# Patient Record
Sex: FEMALE | Race: WHITE | NOT HISPANIC OR LATINO | URBAN - METROPOLITAN AREA
[De-identification: names, ages, dates, MRNs, and addresses within clinical notes are randomized per-mention and may not be internally consistent; named-entity substitution may affect disease eponyms.]

---

## 2017-05-09 ENCOUNTER — OUTPATIENT (OUTPATIENT)
Dept: OUTPATIENT SERVICES | Facility: HOSPITAL | Age: 67
LOS: 1 days | Discharge: HOME | End: 2017-05-09

## 2017-06-28 DIAGNOSIS — Z12.31 ENCOUNTER FOR SCREENING MAMMOGRAM FOR MALIGNANT NEOPLASM OF BREAST: ICD-10-CM

## 2018-05-24 ENCOUNTER — OUTPATIENT (OUTPATIENT)
Dept: OUTPATIENT SERVICES | Facility: HOSPITAL | Age: 68
LOS: 1 days | Discharge: HOME | End: 2018-05-24

## 2018-05-24 DIAGNOSIS — Z12.31 ENCOUNTER FOR SCREENING MAMMOGRAM FOR MALIGNANT NEOPLASM OF BREAST: ICD-10-CM

## 2019-06-05 ENCOUNTER — OUTPATIENT (OUTPATIENT)
Dept: OUTPATIENT SERVICES | Facility: HOSPITAL | Age: 69
LOS: 1 days | Discharge: HOME | End: 2019-06-05
Payer: MEDICARE

## 2019-06-05 DIAGNOSIS — Z12.31 ENCOUNTER FOR SCREENING MAMMOGRAM FOR MALIGNANT NEOPLASM OF BREAST: ICD-10-CM

## 2019-06-05 PROCEDURE — 77063 BREAST TOMOSYNTHESIS BI: CPT | Mod: 26

## 2019-06-05 PROCEDURE — 77067 SCR MAMMO BI INCL CAD: CPT | Mod: 26

## 2019-06-06 ENCOUNTER — OUTPATIENT (OUTPATIENT)
Dept: OUTPATIENT SERVICES | Facility: HOSPITAL | Age: 69
LOS: 1 days | Discharge: HOME | End: 2019-06-06
Payer: MEDICARE

## 2019-06-06 DIAGNOSIS — R92.8 OTHER ABNORMAL AND INCONCLUSIVE FINDINGS ON DIAGNOSTIC IMAGING OF BREAST: ICD-10-CM

## 2019-06-06 PROCEDURE — G0279: CPT | Mod: 26,RT

## 2019-06-06 PROCEDURE — 77065 DX MAMMO INCL CAD UNI: CPT | Mod: 26,RT

## 2019-06-06 PROCEDURE — 76642 ULTRASOUND BREAST LIMITED: CPT | Mod: 26,RT

## 2020-11-11 ENCOUNTER — OUTPATIENT (OUTPATIENT)
Dept: OUTPATIENT SERVICES | Facility: HOSPITAL | Age: 70
LOS: 1 days | Discharge: HOME | End: 2020-11-11
Payer: MEDICARE

## 2020-11-11 DIAGNOSIS — Z12.31 ENCOUNTER FOR SCREENING MAMMOGRAM FOR MALIGNANT NEOPLASM OF BREAST: ICD-10-CM

## 2020-11-11 PROCEDURE — 77067 SCR MAMMO BI INCL CAD: CPT | Mod: 26

## 2020-11-11 PROCEDURE — 77063 BREAST TOMOSYNTHESIS BI: CPT | Mod: 26

## 2021-08-02 PROBLEM — Z00.00 ENCOUNTER FOR PREVENTIVE HEALTH EXAMINATION: Status: ACTIVE | Noted: 2021-08-02

## 2021-08-06 PROBLEM — Z63.4 WIDOW: Status: ACTIVE | Noted: 2021-08-06

## 2021-08-10 ENCOUNTER — APPOINTMENT (OUTPATIENT)
Dept: CARDIOLOGY | Facility: CLINIC | Age: 71
End: 2021-08-10
Payer: MEDICARE

## 2021-08-10 VITALS
HEIGHT: 65 IN | BODY MASS INDEX: 25.99 KG/M2 | WEIGHT: 156 LBS | DIASTOLIC BLOOD PRESSURE: 68 MMHG | SYSTOLIC BLOOD PRESSURE: 120 MMHG

## 2021-08-10 DIAGNOSIS — Z63.4 DISAPPEARANCE AND DEATH OF FAMILY MEMBER: ICD-10-CM

## 2021-08-10 PROCEDURE — 99214 OFFICE O/P EST MOD 30 MIN: CPT

## 2021-08-10 SDOH — SOCIAL STABILITY - SOCIAL INSECURITY: DISSAPEARANCE AND DEATH OF FAMILY MEMBER: Z63.4

## 2021-08-10 NOTE — PHYSICAL EXAM
[5th Left ICS - MCL] : palpated at the 5th LICS in the midclavicular line [No Precordial Heave] : no precordial heave was noted [Normal Rate] : normal [Rhythm Regular] : regular [Normal S1] : normal S1 [Normal S2] : normal S2 [I] : a grade 1 [No Pitting Edema] : no pitting edema present [2+] : left 2+ [No Abnormalities] : the abdominal aorta was not enlarged and no bruit was heard [Normal] : alert and oriented, normal memory [S3] : no S3 [S4] : no S4 [Right Carotid Bruit] : no bruit heard over the right carotid [Left Carotid Bruit] : no bruit heard over the left carotid [Right Femoral Bruit] : no bruit heard over the right femoral artery [Left Femoral Bruit] : no bruit heard over the left femoral artery

## 2021-08-10 NOTE — HISTORY OF PRESENT ILLNESS
[FreeTextEntry1] : Weight no change. Now back in gym. Doing 40 mins 3 days a week. On weight watchers. NNo chest pain. No sob. HERNANDEZ not change

## 2021-08-10 NOTE — DISCUSSION/SUMMARY
[FreeTextEntry1] :   [place] 3/21 alcohol withdrawal. Told continue  exercise. No change in weight. Now on weight watchers.  DSE on 3/19 EF 35-44%,mod MR. Echo possibly soon. Blood LDL 72 was 98.Persantine  fixed defect EF 47%. Now on simvastatin. Possible CAD. She retired 17. May sell house in 1 yr. She got covid vaccine. Switching [place].  Bloods reviewed.She needs foot surgery. Risk is low

## 2021-08-10 NOTE — REVIEW OF SYSTEMS
[Fever] : no fever [Chills] : no chills [Blurry Vision] : no blurred vision [Earache] : no earache [Sore Throat] : no sore throat [SOB] : no shortness of breath [Chest Discomfort] : no chest discomfort [Palpitations] : no palpitations [Cough] : no cough [Wheezing] : no wheezing [Abdominal Pain] : no abdominal pain [Diarrhea] : diarrhea [Constipation] : no constipation [Dysuria] : no dysuria [Joint Pain] : no joint pain [Myalgia] : no myalgia [Rash] : no rash [Skin Lesions] : no skin lesions [Dizziness] : no dizziness [Weakness] : no weakness [Confusion] : no confusion was observed [Swollen Glands] : no swollen glands

## 2021-09-20 ENCOUNTER — OUTPATIENT (OUTPATIENT)
Dept: OUTPATIENT SERVICES | Facility: HOSPITAL | Age: 71
LOS: 1 days | Discharge: HOME | End: 2021-09-20
Payer: MEDICARE

## 2021-09-20 VITALS
SYSTOLIC BLOOD PRESSURE: 152 MMHG | DIASTOLIC BLOOD PRESSURE: 67 MMHG | RESPIRATION RATE: 22 BRPM | TEMPERATURE: 97 F | WEIGHT: 160.72 LBS | HEIGHT: 65 IN | HEART RATE: 61 BPM | OXYGEN SATURATION: 100 %

## 2021-09-20 DIAGNOSIS — Z01.818 ENCOUNTER FOR OTHER PREPROCEDURAL EXAMINATION: ICD-10-CM

## 2021-09-20 DIAGNOSIS — Z98.890 OTHER SPECIFIED POSTPROCEDURAL STATES: Chronic | ICD-10-CM

## 2021-09-20 DIAGNOSIS — M20.22 HALLUX RIGIDUS, LEFT FOOT: ICD-10-CM

## 2021-09-20 LAB
ALBUMIN SERPL ELPH-MCNC: 4.6 G/DL — SIGNIFICANT CHANGE UP (ref 3.5–5.2)
ALP SERPL-CCNC: 90 U/L — SIGNIFICANT CHANGE UP (ref 30–115)
ALT FLD-CCNC: 27 U/L — SIGNIFICANT CHANGE UP (ref 0–41)
ANION GAP SERPL CALC-SCNC: 13 MMOL/L — SIGNIFICANT CHANGE UP (ref 7–14)
APTT BLD: 34.1 SEC — SIGNIFICANT CHANGE UP (ref 27–39.2)
AST SERPL-CCNC: 28 U/L — SIGNIFICANT CHANGE UP (ref 0–41)
BASOPHILS # BLD AUTO: 0.03 K/UL — SIGNIFICANT CHANGE UP (ref 0–0.2)
BASOPHILS NFR BLD AUTO: 0.4 % — SIGNIFICANT CHANGE UP (ref 0–1)
BILIRUB SERPL-MCNC: 0.7 MG/DL — SIGNIFICANT CHANGE UP (ref 0.2–1.2)
BUN SERPL-MCNC: 21 MG/DL — HIGH (ref 10–20)
CALCIUM SERPL-MCNC: 9.7 MG/DL — SIGNIFICANT CHANGE UP (ref 8.5–10.1)
CHLORIDE SERPL-SCNC: 103 MMOL/L — SIGNIFICANT CHANGE UP (ref 98–110)
CO2 SERPL-SCNC: 26 MMOL/L — SIGNIFICANT CHANGE UP (ref 17–32)
CREAT SERPL-MCNC: 0.7 MG/DL — SIGNIFICANT CHANGE UP (ref 0.7–1.5)
EOSINOPHIL # BLD AUTO: 0.19 K/UL — SIGNIFICANT CHANGE UP (ref 0–0.7)
EOSINOPHIL NFR BLD AUTO: 2.6 % — SIGNIFICANT CHANGE UP (ref 0–8)
GLUCOSE SERPL-MCNC: 71 MG/DL — SIGNIFICANT CHANGE UP (ref 70–99)
HCT VFR BLD CALC: 39.9 % — SIGNIFICANT CHANGE UP (ref 37–47)
HGB BLD-MCNC: 13.2 G/DL — SIGNIFICANT CHANGE UP (ref 12–16)
IMM GRANULOCYTES NFR BLD AUTO: 0.1 % — SIGNIFICANT CHANGE UP (ref 0.1–0.3)
INR BLD: 1.06 RATIO — SIGNIFICANT CHANGE UP (ref 0.65–1.3)
LYMPHOCYTES # BLD AUTO: 2.5 K/UL — SIGNIFICANT CHANGE UP (ref 1.2–3.4)
LYMPHOCYTES # BLD AUTO: 34.7 % — SIGNIFICANT CHANGE UP (ref 20.5–51.1)
MCHC RBC-ENTMCNC: 30.3 PG — SIGNIFICANT CHANGE UP (ref 27–31)
MCHC RBC-ENTMCNC: 33.1 G/DL — SIGNIFICANT CHANGE UP (ref 32–37)
MCV RBC AUTO: 91.7 FL — SIGNIFICANT CHANGE UP (ref 81–99)
MONOCYTES # BLD AUTO: 0.72 K/UL — HIGH (ref 0.1–0.6)
MONOCYTES NFR BLD AUTO: 10 % — HIGH (ref 1.7–9.3)
NEUTROPHILS # BLD AUTO: 3.75 K/UL — SIGNIFICANT CHANGE UP (ref 1.4–6.5)
NEUTROPHILS NFR BLD AUTO: 52.2 % — SIGNIFICANT CHANGE UP (ref 42.2–75.2)
NRBC # BLD: 0 /100 WBCS — SIGNIFICANT CHANGE UP (ref 0–0)
PLATELET # BLD AUTO: 283 K/UL — SIGNIFICANT CHANGE UP (ref 130–400)
POTASSIUM SERPL-MCNC: 4.3 MMOL/L — SIGNIFICANT CHANGE UP (ref 3.5–5)
POTASSIUM SERPL-SCNC: 4.3 MMOL/L — SIGNIFICANT CHANGE UP (ref 3.5–5)
PROT SERPL-MCNC: 7.2 G/DL — SIGNIFICANT CHANGE UP (ref 6–8)
PROTHROM AB SERPL-ACNC: 12.2 SEC — SIGNIFICANT CHANGE UP (ref 9.95–12.87)
RBC # BLD: 4.35 M/UL — SIGNIFICANT CHANGE UP (ref 4.2–5.4)
RBC # FLD: 12.7 % — SIGNIFICANT CHANGE UP (ref 11.5–14.5)
SODIUM SERPL-SCNC: 142 MMOL/L — SIGNIFICANT CHANGE UP (ref 135–146)
WBC # BLD: 7.2 K/UL — SIGNIFICANT CHANGE UP (ref 4.8–10.8)
WBC # FLD AUTO: 7.2 K/UL — SIGNIFICANT CHANGE UP (ref 4.8–10.8)

## 2021-09-20 PROCEDURE — 71046 X-RAY EXAM CHEST 2 VIEWS: CPT | Mod: 26

## 2021-09-20 PROCEDURE — 93010 ELECTROCARDIOGRAM REPORT: CPT

## 2021-09-20 NOTE — H&P PST ADULT - HISTORY OF PRESENT ILLNESS
Patient is a  71 year old female presenting to [facility] in preparation for gilliam arthroplasty  1st digit left foot on 10/4 and Gilliam arthroplast 1st digit right foot on 10/18  under LSB anesthesia by   pt reports h/o painful bunions she is scheduled for b/l foot surg   rates pain on 4/10 pain scale  resting improves that pain and ambulations makes the pain worse  reports no c/o cp,sob,palpitations,cough or dysuria  1-2 fos without sob    Patient denies any signs or symptoms of COVID 19 and denies contact with known positive individuals.  They have an appointment for repeat COVID testing pre-procedure and acknowledge its time and place.  They were instructed to quarantine pre-procedure, practice exposure control measures, continue to self-monitor and report any concerns to their proceduralist.    Anesthesia Alert  NO--Difficult Airway  NO--History of neck surgery or radiation  NO--Limited ROM of neck  NO--History of Malignant hyperthermia  NO--Personal or family history of Pseudocholinesterase deficiency  NO--Prior Anesthesia Complication  NO--Latex Allergy  NO--Loose teeth  NO--History of Rheumatoid Arthritis  NO--XIN  NO-- BLEEDING RISK  NO--Other_____    Patient verbalized understanding of instructions and was given the opportunity to ask questions and have them answered.  As per patient, this is their complete medical and surgical history, including medications both prescribed or over the counter.

## 2021-11-12 ENCOUNTER — NON-APPOINTMENT (OUTPATIENT)
Age: 71
End: 2021-11-12

## 2021-11-17 ENCOUNTER — OUTPATIENT (OUTPATIENT)
Dept: OUTPATIENT SERVICES | Facility: HOSPITAL | Age: 71
LOS: 1 days | Discharge: HOME | End: 2021-11-17
Payer: MEDICARE

## 2021-11-17 DIAGNOSIS — Z98.890 OTHER SPECIFIED POSTPROCEDURAL STATES: Chronic | ICD-10-CM

## 2021-11-17 DIAGNOSIS — Z12.31 ENCOUNTER FOR SCREENING MAMMOGRAM FOR MALIGNANT NEOPLASM OF BREAST: ICD-10-CM

## 2021-11-17 PROBLEM — I38 ENDOCARDITIS, VALVE UNSPECIFIED: Chronic | Status: ACTIVE | Noted: 2021-09-20

## 2021-11-17 PROBLEM — J40 BRONCHITIS, NOT SPECIFIED AS ACUTE OR CHRONIC: Chronic | Status: ACTIVE | Noted: 2021-09-20

## 2021-11-17 PROBLEM — Z86.79 PERSONAL HISTORY OF OTHER DISEASES OF THE CIRCULATORY SYSTEM: Chronic | Status: ACTIVE | Noted: 2021-09-20

## 2021-11-17 PROCEDURE — 77063 BREAST TOMOSYNTHESIS BI: CPT | Mod: 26

## 2021-11-17 PROCEDURE — 77067 SCR MAMMO BI INCL CAD: CPT | Mod: 26

## 2021-12-07 ENCOUNTER — APPOINTMENT (OUTPATIENT)
Dept: CARDIOLOGY | Facility: CLINIC | Age: 71
End: 2021-12-07
Payer: MEDICARE

## 2021-12-07 VITALS
SYSTOLIC BLOOD PRESSURE: 122 MMHG | HEIGHT: 65 IN | DIASTOLIC BLOOD PRESSURE: 78 MMHG | BODY MASS INDEX: 26.49 KG/M2 | WEIGHT: 159 LBS

## 2021-12-07 PROCEDURE — 99213 OFFICE O/P EST LOW 20 MIN: CPT

## 2021-12-07 NOTE — HISTORY OF PRESENT ILLNESS
[FreeTextEntry1] : She gained 3 pound.  Not  in gym. Not exercise. On weight watchers. Not following diet. No chest pain. No sob. HERNANDEZ not change. She is deconditioned

## 2021-12-07 NOTE — DISCUSSION/SUMMARY
[FreeTextEntry1] :   [place] 3/21 alcohol withdrawal. Told continue  exercise. She gained 3 pounds.  Now on weight watchers.  DSE on 3/19 EF 35-44%,mod MR. Echo possibly soon. Blood LDL 72 was 98.Persantine  fixed defect EF 47%. Now on simvastatin. Possible CAD. She retired 17. May sell house in 1 yr. She got  2 covid vaccine. [place]ing [place].  Aware need weight loss. She needs echo

## 2021-12-07 NOTE — PHYSICAL EXAM
[5th Left ICS - MCL] : palpated at the 5th LICS in the midclavicular line [No Precordial Heave] : no precordial heave was noted [Normal Rate] : normal [Rhythm Regular] : regular [Normal S1] : normal S1 [Normal S2] : normal S2 [S3] : no S3 [S4] : no S4 [I] : a grade 1 [No Pitting Edema] : no pitting edema present [Right Carotid Bruit] : no bruit heard over the right carotid [Left Carotid Bruit] : no bruit heard over the left carotid [2+] : left 2+ [Right Femoral Bruit] : no bruit heard over the right femoral artery [Left Femoral Bruit] : no bruit heard over the left femoral artery [No Abnormalities] : the abdominal aorta was not enlarged and no bruit was heard [Normal] : alert and oriented, normal memory

## 2022-01-03 ENCOUNTER — RX RENEWAL (OUTPATIENT)
Age: 72
End: 2022-01-03

## 2022-01-18 ENCOUNTER — OUTPATIENT (OUTPATIENT)
Dept: OUTPATIENT SERVICES | Facility: HOSPITAL | Age: 72
LOS: 1 days | Discharge: HOME | End: 2022-01-18
Payer: MEDICARE

## 2022-01-18 DIAGNOSIS — Z98.890 OTHER SPECIFIED POSTPROCEDURAL STATES: Chronic | ICD-10-CM

## 2022-01-18 DIAGNOSIS — I34.0 NONRHEUMATIC MITRAL (VALVE) INSUFFICIENCY: ICD-10-CM

## 2022-01-18 DIAGNOSIS — I42.9 CARDIOMYOPATHY, UNSPECIFIED: ICD-10-CM

## 2022-01-18 PROCEDURE — 93306 TTE W/DOPPLER COMPLETE: CPT | Mod: 26

## 2022-04-21 ENCOUNTER — APPOINTMENT (OUTPATIENT)
Dept: CARDIOLOGY | Facility: CLINIC | Age: 72
End: 2022-04-21
Payer: MEDICARE

## 2022-04-21 ENCOUNTER — TRANSCRIPTION ENCOUNTER (OUTPATIENT)
Age: 72
End: 2022-04-21

## 2022-04-21 VITALS
DIASTOLIC BLOOD PRESSURE: 80 MMHG | WEIGHT: 159 LBS | BODY MASS INDEX: 26.49 KG/M2 | HEIGHT: 65 IN | SYSTOLIC BLOOD PRESSURE: 124 MMHG

## 2022-04-21 PROCEDURE — 99214 OFFICE O/P EST MOD 30 MIN: CPT

## 2022-04-21 NOTE — DISCUSSION/SUMMARY
[FreeTextEntry1] :   [place] 3/21 alcohol withdrawal. Told continue  exercise. Weight no change  Now on weight watchers.  DSE on 3/19 EF 35-44%,mod MR. Blood LDL 72 was 98.Persantine  fixed defect EF 47%. Now on simvastatin. Possible CAD. She retired 17. May sell house in 1 yr. She got  3 covid vaccine. [place]ing [place].  Aware need weight loss.  Told resume exercise..\par Echo 30-35% mod mr. echo about  Reviewed  with patient.

## 2022-04-21 NOTE — PHYSICAL EXAM
[5th Left ICS - MCL] : palpated at the 5th LICS in the midclavicular line [No Precordial Heave] : no precordial heave was noted [Normal Rate] : normal [Rhythm Regular] : regular [Normal S1] : normal S1 [Normal S2] : normal S2 [S3] : no S3 [S4] : no S4 [No Pitting Edema] : no pitting edema present [I] : a grade 1 [Right Carotid Bruit] : no bruit heard over the right carotid [Left Carotid Bruit] : no bruit heard over the left carotid [2+] : left 2+ [Right Femoral Bruit] : no bruit heard over the right femoral artery [Left Femoral Bruit] : no bruit heard over the left femoral artery [No Abnormalities] : the abdominal aorta was not enlarged and no bruit was heard [Normal] : alert and oriented, normal memory

## 2022-04-21 NOTE — HISTORY OF PRESENT ILLNESS
[FreeTextEntry1] : Weight without change.   Not  in gym. Not exercise. On weight watchers. Not following diet. No chest pain. No sob. HERNANDEZ not change. She is deconditioned

## 2022-07-18 ENCOUNTER — RX RENEWAL (OUTPATIENT)
Age: 72
End: 2022-07-18

## 2022-08-24 ENCOUNTER — RESULT CHARGE (OUTPATIENT)
Age: 72
End: 2022-08-24

## 2022-08-24 ENCOUNTER — APPOINTMENT (OUTPATIENT)
Dept: CARDIOLOGY | Facility: CLINIC | Age: 72
End: 2022-08-24

## 2022-08-24 VITALS
SYSTOLIC BLOOD PRESSURE: 120 MMHG | DIASTOLIC BLOOD PRESSURE: 70 MMHG | HEIGHT: 65 IN | WEIGHT: 159 LBS | BODY MASS INDEX: 26.49 KG/M2

## 2022-08-24 PROCEDURE — 99214 OFFICE O/P EST MOD 30 MIN: CPT

## 2022-08-24 RX ORDER — ASPIRIN 81 MG
81 TABLET, DELAYED RELEASE (ENTERIC COATED) ORAL
Refills: 0 | Status: ACTIVE | COMMUNITY

## 2022-08-24 NOTE — DISCUSSION/SUMMARY
[FreeTextEntry1] : \par Pt with PMH CMP w fixed defect persantine . EF then 47%, mod MR. LBBB. Possible CAD on simva .3/19  Dobutamine SE Neg, EF 35-44f%  ECHO  EF 30-35%   3/21 ETOH withdrawal. Pt has no symptoms of HF .She Is having foot surgery under local w SB sedation in amb surg unit by Dr Doran. She needs Adenosine Thal Stress to assess for ischemia with her decreasing EF. Will add spironolactone 25mg  Mon wed Fri and ck labs 2 wk after .Consideration for EP eval for ICD for primary prevention, Pt w LBBB. This case completely discussed and reviewed by Dr Douglas and agrees with plan

## 2022-08-24 NOTE — HISTORY OF PRESENT ILLNESS
[FreeTextEntry1] : Weight without change.   Not  in gym. Not exercise. On weight watchers. Not following diet. No chest pain. No sob. HERNANDEZ not change. She is deconditioned\par 73 y/o with PMH of CMP, LBBB and MR here for Routine f/u. Was Told by Dr DALY she needed to exercise so she started going to the gym and feels much better .She does weights and goes on elliptical 3x wk. She denies chest pain HERNANDEZ palpitations dizziness or near syncope. She has a bunion on her right foot that needs to be treated under Local w SB sedation by Dr Calzada in the [facility] surgery center. Pt with h/o a fixed defect with a persantine in 12/14and EF of 47%, in 3/29 it was 35-44% and in 1/22 EF was 30-35%. Pt is completely asymptomatic and without HF symptoms.

## 2022-08-24 NOTE — REASON FOR VISIT
[Symptom and Test Evaluation] : symptom and test evaluation [Structural Heart and Valve Disease] : structural heart and valve disease [FreeTextEntry1] : Pt here for f/u and wants clearance for bunion surgery

## 2022-09-15 ENCOUNTER — RESULT REVIEW (OUTPATIENT)
Age: 72
End: 2022-09-15

## 2022-09-15 ENCOUNTER — OUTPATIENT (OUTPATIENT)
Dept: OUTPATIENT SERVICES | Facility: HOSPITAL | Age: 72
LOS: 1 days | Discharge: HOME | End: 2022-09-15

## 2022-09-15 DIAGNOSIS — I42.9 CARDIOMYOPATHY, UNSPECIFIED: ICD-10-CM

## 2022-09-15 DIAGNOSIS — Z98.890 OTHER SPECIFIED POSTPROCEDURAL STATES: Chronic | ICD-10-CM

## 2022-09-15 PROCEDURE — 78452 HT MUSCLE IMAGE SPECT MULT: CPT | Mod: 26

## 2022-09-21 ENCOUNTER — APPOINTMENT (OUTPATIENT)
Dept: CARDIOLOGY | Facility: CLINIC | Age: 72
End: 2022-09-21

## 2022-09-21 VITALS
BODY MASS INDEX: 26.49 KG/M2 | SYSTOLIC BLOOD PRESSURE: 136 MMHG | DIASTOLIC BLOOD PRESSURE: 82 MMHG | WEIGHT: 159 LBS | HEIGHT: 65 IN

## 2022-09-21 PROCEDURE — 93000 ELECTROCARDIOGRAM COMPLETE: CPT

## 2022-09-21 PROCEDURE — 99214 OFFICE O/P EST MOD 30 MIN: CPT | Mod: 25

## 2022-09-21 NOTE — DISCUSSION/SUMMARY
[FreeTextEntry1] :   [place] 3/21 alcohol withdrawal. Told continue  exercise. Weight no change  Now on weight watchers.  DSE on 3/19 EF 35-44%,mod MR. Blood LDL 72 was 98.Persantine  fixed defect EF 47%. Now on simvastatin. Possible CAD. She retired 17. May sell house in 1 yr. She got  3 covid vaccine. Switching [place].  Aware need weight loss.  Told resume exercise..\par Echo 30-35% mod mr. echo about  .\par Lexiscan  ischemia. Reviewed with patient and family risk and benefit. She agrees to cardiac cath

## 2022-09-21 NOTE — HISTORY OF PRESENT ILLNESS
[FreeTextEntry1] : Weight without change.   In gym 3 days a week. Ellipticle 45 minutes. . On weight watchers. . No chest pain. No sob. HERNANDEZ not change. She is deconditioned

## 2022-09-26 ENCOUNTER — LABORATORY RESULT (OUTPATIENT)
Age: 72
End: 2022-09-26

## 2022-09-29 ENCOUNTER — OUTPATIENT (OUTPATIENT)
Dept: OUTPATIENT SERVICES | Facility: HOSPITAL | Age: 72
LOS: 1 days | Discharge: HOME | End: 2022-09-29

## 2022-09-29 DIAGNOSIS — Z98.890 OTHER SPECIFIED POSTPROCEDURAL STATES: Chronic | ICD-10-CM

## 2022-09-29 LAB
HCT VFR BLD CALC: 42.7 % — SIGNIFICANT CHANGE UP (ref 37–47)
HGB BLD-MCNC: 14.3 G/DL — SIGNIFICANT CHANGE UP (ref 12–16)
MCHC RBC-ENTMCNC: 31.8 PG — HIGH (ref 27–31)
MCHC RBC-ENTMCNC: 33.5 G/DL — SIGNIFICANT CHANGE UP (ref 32–37)
MCV RBC AUTO: 94.9 FL — SIGNIFICANT CHANGE UP (ref 81–99)
NRBC # BLD: 0 /100 WBCS — SIGNIFICANT CHANGE UP (ref 0–0)
PLATELET # BLD AUTO: 300 K/UL — SIGNIFICANT CHANGE UP (ref 130–400)
RBC # BLD: 4.5 M/UL — SIGNIFICANT CHANGE UP (ref 4.2–5.4)
RBC # FLD: 12.9 % — SIGNIFICANT CHANGE UP (ref 11.5–14.5)
WBC # BLD: 8.65 K/UL — SIGNIFICANT CHANGE UP (ref 4.8–10.8)
WBC # FLD AUTO: 8.65 K/UL — SIGNIFICANT CHANGE UP (ref 4.8–10.8)

## 2022-09-29 PROCEDURE — 93458 L HRT ARTERY/VENTRICLE ANGIO: CPT | Mod: 26

## 2022-09-29 NOTE — H&P CARDIOLOGY - HISTORY OF PRESENT ILLNESS
Patient is a  72 year old F PMH: : bronchitis, cardiomyopathy, LBBB, MR                                        PSH: multiple breast sx for calcified cysts  Pt had nuc stress test on 9/15/22 revealing anteroseptal perfusion defect c/w ischemia and EF 31%, [facility] recommended        Vital Signs Last 24 Hrs  T(C): --  T(F): --  HR: --69  BP: --116/53  BP(mean): --78  RR: --  SpO2: --98% RA        Pre cath note:  indication:  [ ] STEMI                [ ] NSTEMI                 [ ] Acute coronary syndrome                   [ ]Unstable Angina   [ ] high risk  [ ] intermediate risk  [ ] low risk                   [ ] Stable Angina     non-invasive testing:    nuc stress                      Date:    9/15/22                 result: [ ] high risk  [ ] intermediate risk  [ ] low risk    Anti- Anginal medications:                    [ ] not used                       [ x] used                   [ ] not used but strong indication not to use    Ejection Fraction                   [ ] <29            [ x] 30-39%   [ ] 40-49%     [ ]>50%    CHF                   [ ] active (within last 14 days on meds   [ ] Chronic (on meds but no exacerbation)    COPD                   [ ] mild (on chronic bronchodilators)  [ ] moderate (on chronic steroid therapy)      [ ] severe (indication for home O2 or PACO2 >50)    Other risk factors:                     [ ] Previous MI                     [ ] CVA/ stroke                    [ ] carotid stent/ CEA                    [ ] PVD/PAD- (arterial aneurysm, non-palpable pulses, tortuous vessel with inability to insert catheter, infra-renal dissection, renal or subclavian artery stenosis)                    [ ] diabetic                    [ ] previous CABG                    [ ] Renal Failure     Bleeding Risk: 1.7%                          14.3   8.65  )-----------( 300      ( 29 Sep 2022 08:52 )             42.7     REVIEW OF SYSTEMS:  CONSTITUTIONAL: No fever, weight loss, or fatigue  CARDIOLOGY: PAtient denies chest pain, shortness of breath or syncopal episodes.   RESPIRATORY: denies shortness of breath, wheezing  NEUROLOGICAL: NO weakness, no focal deficits to report.  GI: no BRBPR, no N,V,diarrhea.     PHYSICAL EXAM:  · CONSTITUTIONAL:	Well-developed, well nourished    ·RESPIRATORY:   airway patent; breath sounds equal; good air movement; respirations non-labored; clear to auscultation bilaterally; no chest wall tenderness; no intercostal retractions; no rales,rhonchi or wheeze  · CARDIOVASCULAR	regular rate and rhythm  no rub  no murmur  normal PMI  · EXTREMITIES: No cyanosis, clubbing or edema  · VASCULAR: 	Equal and normal pulses (carotid, femoral, dorsalis pedis)  	        EKG:   EF: 31% on 9/15/22   Patient is a  72 year old F PMH: : bronchitis, cardiomyopathy, LBBB, MR                                        PSH: multiple breast sx for calcified cysts  Pt had nuc stress test on 9/15/22 revealing anteroseptal perfusion defect c/w ischemia and EF 31%, [facility] recommended        Vital Signs Last 24 Hrs  T(C): --  T(F): --  HR: --69  BP: --116/53  BP(mean): --78  RR: --  SpO2: --98% RA        Pre cath note:  indication:  [ ] STEMI                [ ] NSTEMI                 [ ] Acute coronary syndrome                   [ ]Unstable Angina   [ ] high risk  [ ] intermediate risk  [ ] low risk                   [ ] Stable Angina     non-invasive testing:    nuc stress                      Date:    9/15/22                 result: [ ] high risk  [ ] intermediate risk  [ ] low risk    Anti- Anginal medications:                    [ ] not used                       [ x] used                   [ ] not used but strong indication not to use    Ejection Fraction                   [ ] <29            [ x] 30-39%   [ ] 40-49%     [ ]>50%    CHF                   [ ] active (within last 14 days on meds   [ ] Chronic (on meds but no exacerbation)    COPD                   [ ] mild (on chronic bronchodilators)  [ ] moderate (on chronic steroid therapy)      [ ] severe (indication for home O2 or PACO2 >50)    Other risk factors:                     [ ] Previous MI                     [ ] CVA/ stroke                    [ ] carotid stent/ CEA                    [ ] PVD/PAD- (arterial aneurysm, non-palpable pulses, tortuous vessel with inability to insert catheter, infra-renal dissection, renal or subclavian artery stenosis)                    [ ] diabetic                    [ ] previous CABG                    [ ] Renal Failure     Bleeding Risk: 1.7%                          14.3   8.65  )-----------( 300      ( 29 Sep 2022 08:52 )             42.7     REVIEW OF SYSTEMS:  CONSTITUTIONAL: No fever, weight loss, or fatigue  CARDIOLOGY: PAtient denies chest pain, shortness of breath or syncopal episodes.   RESPIRATORY: denies shortness of breath, wheezing  NEUROLOGICAL: NO weakness, no focal deficits to report.  GI: no BRBPR, no N,V,diarrhea.     PHYSICAL EXAM:  · CONSTITUTIONAL:	Well-developed, well nourished    ·RESPIRATORY:   airway patent; breath sounds equal; good air movement; respirations non-labored; clear to auscultation bilaterally; no chest wall tenderness; no intercostal retractions; no rales,rhonchi or wheeze  · CARDIOVASCULAR	regular rate and rhythm  no rub  no murmur  normal PMI  · EXTREMITIES: No cyanosis, clubbing or edema  · VASCULAR: 	Equal and normal pulses (carotid, femoral, dorsalis pedis)  	        EKG: SR/PVC's/LBBB (old)  EF: 31% on 9/15/22

## 2022-09-29 NOTE — CHART NOTE - NSCHARTNOTEFT_GEN_A_CORE
PRE-OP DIAGNOSIS:  Positive Stress Test      PROCEDURE:     [X] Coronary Angiogram     [X] LHC     [X] LVG     [] RHC     [] Intervention (see below)         PHYSICIAN:  Dr. Alvarado    ASSISTANT:  Dr. Otoole       PROCEDURE DESCRIPTION:     Consent:      [X] Patient     [] Family Member     []  Used        Anesthesia:     [X] General     [] Sedation     [X] Local        Access & Closure:     [X] 6 Fr Radial Artery     [] Fr Femoral Artery     [] Fr Femoral Vein     [] Fr Brachial Vein       IV Contrast: 50 mL        Intervention: None      Implants: None       FINDINGS:     Coronary Dominance: L dominate      LM: No significant disease    LAD: Minor irregularities     CX: Large dominate vessel. No significant disease    RCA: Small non-dominate vessel    LVEDP: 5 mmHg     EF: 35 %        ESTIMATED BLOOD LOSS: < 10 mL        CONDITION:     [X] Good     [] Fair     [] Critical        SPECIMEN REMOVED: N/A       POST-OP DIAGNOSIS:      [] Normal Coronary Angiogram     [X] Mild Coronary Artery Disease (< 50% stenosis)     [] __ Vessel Coronary Artery Disease        PLAN OF CARE:     [X] D/C Home Today     [] Return to In-patient bed     [] Admit for observation     [] Return for Staged Procedure     [] CT Surgery Consult     [] Medications:     [X] IV Fluids: NS 100cc/hr x 2 hrs

## 2022-10-04 DIAGNOSIS — I38 ENDOCARDITIS, VALVE UNSPECIFIED: ICD-10-CM

## 2022-10-04 DIAGNOSIS — R94.39 ABNORMAL RESULT OF OTHER CARDIOVASCULAR FUNCTION STUDY: ICD-10-CM

## 2022-10-04 DIAGNOSIS — Z88.0 ALLERGY STATUS TO PENICILLIN: ICD-10-CM

## 2022-10-04 DIAGNOSIS — Z79.899 OTHER LONG TERM (CURRENT) DRUG THERAPY: ICD-10-CM

## 2022-10-04 DIAGNOSIS — Z79.82 LONG TERM (CURRENT) USE OF ASPIRIN: ICD-10-CM

## 2022-10-04 DIAGNOSIS — I25.10 ATHEROSCLEROTIC HEART DISEASE OF NATIVE CORONARY ARTERY WITHOUT ANGINA PECTORIS: ICD-10-CM

## 2022-10-18 PROBLEM — Z86.79 HISTORY OF CARDIOMYOPATHY: Status: RESOLVED | Noted: 2021-08-06 | Resolved: 2022-10-18

## 2022-10-19 ENCOUNTER — APPOINTMENT (OUTPATIENT)
Dept: CARDIOLOGY | Facility: CLINIC | Age: 72
End: 2022-10-19

## 2022-10-19 VITALS
HEIGHT: 65 IN | DIASTOLIC BLOOD PRESSURE: 72 MMHG | WEIGHT: 160 LBS | SYSTOLIC BLOOD PRESSURE: 108 MMHG | BODY MASS INDEX: 26.66 KG/M2

## 2022-10-19 DIAGNOSIS — M21.619 BUNION OF UNSPECIFIED FOOT: ICD-10-CM

## 2022-10-19 DIAGNOSIS — Z86.79 PERSONAL HISTORY OF OTHER DISEASES OF THE CIRCULATORY SYSTEM: ICD-10-CM

## 2022-10-19 PROCEDURE — 99214 OFFICE O/P EST MOD 30 MIN: CPT

## 2022-10-19 NOTE — HISTORY OF PRESENT ILLNESS
[FreeTextEntry1] : \par 73 y/o with PMH of CMP, LBBB and MR here for f/u . Was Told by Dr DALY she needed to exercise so she started going to the gym and feels much better .She does weights and goes on elliptical 3x wk. She denies chest pain HERNANDEZ palpitations dizziness or near syncope. She has a bunion on her right foot that needs to be treated under Local w SB sedation by Dr Calzada in the [facility] surgery center. Pt with h/o a fixed defect with a persantine in 12/14and EF of 47%, in 3/29 it was 35-44% and in 1/22 EF was 30-35%. \par . Pt here for f/u  Had + lexiscan and subsequent  LHC 9/29/22  Dr Torres no obstructive disease. Pt is completely asymptomatic

## 2022-10-19 NOTE — REASON FOR VISIT
[Symptom and Test Evaluation] : symptom and test evaluation [Structural Heart and Valve Disease] : structural heart and valve disease [FreeTextEntry1] : Pt here for f/u

## 2022-10-19 NOTE — DISCUSSION/SUMMARY
[FreeTextEntry1] : \par Pt with PMH CMP w fixed defect persantine . EF then 47%, mod MR. LBBB. Possible CAD on simva .3/19  Dobutamine SE Neg, EF 35-44f%  ECHO  EF 30-35%   3/21 ETOH withdrawal. Pt has no symptoms of HF .She Is having foot surgery under local w SB sedation in amb surg unit by Dr Doran. She needs Adenosine Thal Stress to assess for ischemia with her decreasing EF. Will add spironolactone 25mg  Mon wed Fri and ck labs 2 wk after .Consideration for EP eval for ICD for primary prevention, Pt w LBBB. This case completely discussed and reviewed by Dr Douglas and agrees with plan\par Pt here for f/u  Had + lexiscan and subsequent  LHC 22  Dr Torres no obstructive disease. Pt is completely asymptomatic  Class 2, no SOB will continue present mgmt . Reviewed all medications and their indications for CMP and significance of her NICMP. D/w Dr DALY  and does not recommend  further EP eval

## 2022-12-01 ENCOUNTER — OUTPATIENT (OUTPATIENT)
Dept: OUTPATIENT SERVICES | Facility: HOSPITAL | Age: 72
LOS: 1 days | Discharge: HOME | End: 2022-12-01

## 2022-12-01 DIAGNOSIS — Z12.31 ENCOUNTER FOR SCREENING MAMMOGRAM FOR MALIGNANT NEOPLASM OF BREAST: ICD-10-CM

## 2022-12-01 DIAGNOSIS — Z98.890 OTHER SPECIFIED POSTPROCEDURAL STATES: Chronic | ICD-10-CM

## 2022-12-01 PROCEDURE — 77067 SCR MAMMO BI INCL CAD: CPT | Mod: 26

## 2022-12-01 PROCEDURE — 77063 BREAST TOMOSYNTHESIS BI: CPT | Mod: 26

## 2023-01-03 ENCOUNTER — RX RENEWAL (OUTPATIENT)
Age: 73
End: 2023-01-03

## 2023-01-26 ENCOUNTER — APPOINTMENT (OUTPATIENT)
Dept: CARDIOLOGY | Facility: CLINIC | Age: 73
End: 2023-01-26
Payer: MEDICARE

## 2023-01-26 VITALS
SYSTOLIC BLOOD PRESSURE: 126 MMHG | BODY MASS INDEX: 27.16 KG/M2 | HEIGHT: 65 IN | DIASTOLIC BLOOD PRESSURE: 68 MMHG | WEIGHT: 163 LBS

## 2023-01-26 PROCEDURE — 99214 OFFICE O/P EST MOD 30 MIN: CPT

## 2023-01-26 NOTE — DISCUSSION/SUMMARY
[FreeTextEntry1] :   [place] 3/21 alcohol withdrawal. Told increase   exercise.    DSE on 3/19 EF 35-44%,mod MR. Blood LDL 72 was 98.Persantine  fixed defect EF 47%. Now on simvastatin. Possible CAD. She retired 17.  She got  3 covid vaccine. [place]ing [place].  Aware need weight loss.  Told increase  exercise..Aware need weight loss\par Echo 30-35% mod mr. echo about  .\par Lexiscan  ischemia. Cath  coronaries . To get bunions . Dr Calzada. Risk surgery low to intermediate

## 2023-01-26 NOTE — HISTORY OF PRESENT ILLNESS
[FreeTextEntry1] : She gained 3 llbs.   In gym 2  days a week. Ellipticle 45 minutes. At times use treadmill. . Off  weight watchers. .To restart.  No chest pain. No sob. HERNANDEZ not change. She is deconditioned

## 2023-03-24 ENCOUNTER — OUTPATIENT (OUTPATIENT)
Dept: OUTPATIENT SERVICES | Facility: HOSPITAL | Age: 73
LOS: 1 days | End: 2023-03-24
Payer: MEDICARE

## 2023-03-24 VITALS
RESPIRATION RATE: 16 BRPM | TEMPERATURE: 98 F | HEART RATE: 66 BPM | HEIGHT: 65 IN | WEIGHT: 158.95 LBS | DIASTOLIC BLOOD PRESSURE: 74 MMHG | OXYGEN SATURATION: 99 % | SYSTOLIC BLOOD PRESSURE: 160 MMHG

## 2023-03-24 DIAGNOSIS — Z98.890 OTHER SPECIFIED POSTPROCEDURAL STATES: Chronic | ICD-10-CM

## 2023-03-24 DIAGNOSIS — Z01.818 ENCOUNTER FOR OTHER PREPROCEDURAL EXAMINATION: ICD-10-CM

## 2023-03-24 DIAGNOSIS — M20.21 HALLUX RIGIDUS, RIGHT FOOT: ICD-10-CM

## 2023-03-24 LAB
ALBUMIN SERPL ELPH-MCNC: 4.6 G/DL — SIGNIFICANT CHANGE UP (ref 3.5–5.2)
ALP SERPL-CCNC: 93 U/L — SIGNIFICANT CHANGE UP (ref 30–115)
ALT FLD-CCNC: 23 U/L — SIGNIFICANT CHANGE UP (ref 0–41)
ANION GAP SERPL CALC-SCNC: 12 MMOL/L — SIGNIFICANT CHANGE UP (ref 7–14)
AST SERPL-CCNC: 25 U/L — SIGNIFICANT CHANGE UP (ref 0–41)
BASOPHILS # BLD AUTO: 0.04 K/UL — SIGNIFICANT CHANGE UP (ref 0–0.2)
BASOPHILS NFR BLD AUTO: 0.6 % — SIGNIFICANT CHANGE UP (ref 0–1)
BILIRUB SERPL-MCNC: 0.6 MG/DL — SIGNIFICANT CHANGE UP (ref 0.2–1.2)
BUN SERPL-MCNC: 21 MG/DL — HIGH (ref 10–20)
CALCIUM SERPL-MCNC: 9.9 MG/DL — SIGNIFICANT CHANGE UP (ref 8.4–10.5)
CHLORIDE SERPL-SCNC: 104 MMOL/L — SIGNIFICANT CHANGE UP (ref 98–110)
CO2 SERPL-SCNC: 27 MMOL/L — SIGNIFICANT CHANGE UP (ref 17–32)
CREAT SERPL-MCNC: 0.8 MG/DL — SIGNIFICANT CHANGE UP (ref 0.7–1.5)
EGFR: 78 ML/MIN/1.73M2 — SIGNIFICANT CHANGE UP
EOSINOPHIL # BLD AUTO: 0.17 K/UL — SIGNIFICANT CHANGE UP (ref 0–0.7)
EOSINOPHIL NFR BLD AUTO: 2.4 % — SIGNIFICANT CHANGE UP (ref 0–8)
GLUCOSE SERPL-MCNC: 87 MG/DL — SIGNIFICANT CHANGE UP (ref 70–99)
HCT VFR BLD CALC: 40.9 % — SIGNIFICANT CHANGE UP (ref 37–47)
HGB BLD-MCNC: 13.5 G/DL — SIGNIFICANT CHANGE UP (ref 12–16)
IMM GRANULOCYTES NFR BLD AUTO: 0.3 % — SIGNIFICANT CHANGE UP (ref 0.1–0.3)
LYMPHOCYTES # BLD AUTO: 2.06 K/UL — SIGNIFICANT CHANGE UP (ref 1.2–3.4)
LYMPHOCYTES # BLD AUTO: 29.6 % — SIGNIFICANT CHANGE UP (ref 20.5–51.1)
MCHC RBC-ENTMCNC: 31.2 PG — HIGH (ref 27–31)
MCHC RBC-ENTMCNC: 33 G/DL — SIGNIFICANT CHANGE UP (ref 32–37)
MCV RBC AUTO: 94.5 FL — SIGNIFICANT CHANGE UP (ref 81–99)
MONOCYTES # BLD AUTO: 0.81 K/UL — HIGH (ref 0.1–0.6)
MONOCYTES NFR BLD AUTO: 11.6 % — HIGH (ref 1.7–9.3)
NEUTROPHILS # BLD AUTO: 3.87 K/UL — SIGNIFICANT CHANGE UP (ref 1.4–6.5)
NEUTROPHILS NFR BLD AUTO: 55.5 % — SIGNIFICANT CHANGE UP (ref 42.2–75.2)
NRBC # BLD: 0 /100 WBCS — SIGNIFICANT CHANGE UP (ref 0–0)
PLATELET # BLD AUTO: 261 K/UL — SIGNIFICANT CHANGE UP (ref 130–400)
POTASSIUM SERPL-MCNC: 5 MMOL/L — SIGNIFICANT CHANGE UP (ref 3.5–5)
POTASSIUM SERPL-SCNC: 5 MMOL/L — SIGNIFICANT CHANGE UP (ref 3.5–5)
PROT SERPL-MCNC: 7.1 G/DL — SIGNIFICANT CHANGE UP (ref 6–8)
RBC # BLD: 4.33 M/UL — SIGNIFICANT CHANGE UP (ref 4.2–5.4)
RBC # FLD: 13.1 % — SIGNIFICANT CHANGE UP (ref 11.5–14.5)
SODIUM SERPL-SCNC: 143 MMOL/L — SIGNIFICANT CHANGE UP (ref 135–146)
WBC # BLD: 6.97 K/UL — SIGNIFICANT CHANGE UP (ref 4.8–10.8)
WBC # FLD AUTO: 6.97 K/UL — SIGNIFICANT CHANGE UP (ref 4.8–10.8)

## 2023-03-24 PROCEDURE — 85025 COMPLETE CBC W/AUTO DIFF WBC: CPT

## 2023-03-24 PROCEDURE — 71046 X-RAY EXAM CHEST 2 VIEWS: CPT | Mod: 26

## 2023-03-24 PROCEDURE — 71046 X-RAY EXAM CHEST 2 VIEWS: CPT

## 2023-03-24 PROCEDURE — 36415 COLL VENOUS BLD VENIPUNCTURE: CPT

## 2023-03-24 PROCEDURE — 93005 ELECTROCARDIOGRAM TRACING: CPT

## 2023-03-24 PROCEDURE — 99214 OFFICE O/P EST MOD 30 MIN: CPT | Mod: 25

## 2023-03-24 PROCEDURE — 80053 COMPREHEN METABOLIC PANEL: CPT

## 2023-03-24 PROCEDURE — 93010 ELECTROCARDIOGRAM REPORT: CPT

## 2023-03-24 NOTE — H&P PST ADULT - NSICDXPASTMEDICALHX_GEN_ALL_CORE_FT
PAST MEDICAL HISTORY:  Bronchitis     History of cardiomyopathy     History of left bundle branch block (LBBB)     Leaky heart valve

## 2023-03-24 NOTE — H&P PST ADULT - NSICDXPASTSURGICALHX_GEN_ALL_CORE_FT
PAST SURGICAL HISTORY:  H/O breast biopsy     History of left heart catheterization (LHC)     History of surgery breast surg    
Patent

## 2023-03-24 NOTE — H&P PST ADULT - NSANTHOSAYNRD_GEN_A_CORE
No. XIN screening performed.  STOP BANG Legend: 0-2 = LOW Risk; 3-4 = INTERMEDIATE Risk; 5-8 = HIGH Risk

## 2023-03-24 NOTE — H&P PST ADULT - HISTORY OF PRESENT ILLNESS
72 yr old female states pain B/L feet -bunions -1st toe for many yrs " but they bother me more and I am not able to wear certain shoes because it hurts" presents to [facility] in prep  for [facility] ARTHROPLASTY RIGHT FOOT- 4/14/23 and the same surgery on the LEFT foot on 5/5/2023. Denies COVID S/S. Recd 4 doses vaccine. Verbalized understanding of COVID prevention measures. Exercise andrea 1 FOS -slowly LTD by fatigue and pain -feet.  Anesthesia Alert  YES-Difficult Airway-class 4  NO--History of neck surgery or radiation  NO--Limited ROM of neck  NO--History of Malignant hyperthermia  NO--Personal or family history of Pseudocholinesterase deficiency  NO--Prior Anesthesia Complication  NO--Latex Allergy  NO--Loose teeth  NO--History of Rheumatoid Arthritis  NO--XIN  No Bleeding risk  NO--Other_____

## 2023-03-24 NOTE — H&P PST ADULT - NS PRO AD NO ADVANCE DIRECTIVE
Arrived here and states that she hasn't had a bowel movement in several days. She states that she takes opioids and has had this problem before. She feels like she is impacted. She tried prune juice and senna without any relief.    No

## 2023-03-25 DIAGNOSIS — M20.21 HALLUX RIGIDUS, RIGHT FOOT: ICD-10-CM

## 2023-03-25 DIAGNOSIS — Z01.818 ENCOUNTER FOR OTHER PREPROCEDURAL EXAMINATION: ICD-10-CM

## 2023-04-03 ENCOUNTER — RX RENEWAL (OUTPATIENT)
Age: 73
End: 2023-04-03

## 2023-04-13 NOTE — ASU PATIENT PROFILE, ADULT - NSICDXPASTSURGICALHX_GEN_ALL_CORE_FT
PAST SURGICAL HISTORY:  H/O breast biopsy     History of left heart catheterization (LHC)     History of surgery breast surg

## 2023-04-13 NOTE — ASU PATIENT PROFILE, ADULT - AS SC BRADEN NUTRITION
Home referral placed.  I would need to see her in the office to discuss and exam her for the falls, not a VV   (3) adequate

## 2023-04-13 NOTE — ASU PATIENT PROFILE, ADULT - FALL HARM RISK - UNIVERSAL INTERVENTIONS
Bed in lowest position, wheels locked, appropriate side rails in place/Call bell, personal items and telephone in reach/Instruct patient to call for assistance before getting out of bed or chair/Non-slip footwear when patient is out of bed/[place] to call system/Physically safe environment - no spills, clutter or unnecessary equipment/Purposeful Proactive Rounding/Room/bathroom lighting operational, light cord in reach

## 2023-04-14 ENCOUNTER — OUTPATIENT (OUTPATIENT)
Dept: INPATIENT UNIT | Facility: HOSPITAL | Age: 73
LOS: 1 days | Discharge: HOME | End: 2023-04-14
Payer: MEDICARE

## 2023-04-14 ENCOUNTER — TRANSCRIPTION ENCOUNTER (OUTPATIENT)
Age: 73
End: 2023-04-14

## 2023-04-14 ENCOUNTER — RESULT REVIEW (OUTPATIENT)
Age: 73
End: 2023-04-14

## 2023-04-14 VITALS
RESPIRATION RATE: 16 BRPM | SYSTOLIC BLOOD PRESSURE: 118 MMHG | OXYGEN SATURATION: 99 % | HEART RATE: 60 BPM | TEMPERATURE: 97 F | HEIGHT: 65 IN | DIASTOLIC BLOOD PRESSURE: 56 MMHG | WEIGHT: 158.95 LBS

## 2023-04-14 VITALS
OXYGEN SATURATION: 96 % | HEART RATE: 56 BPM | DIASTOLIC BLOOD PRESSURE: 62 MMHG | SYSTOLIC BLOOD PRESSURE: 134 MMHG | RESPIRATION RATE: 14 BRPM

## 2023-04-14 DIAGNOSIS — M20.21 HALLUX RIGIDUS, RIGHT FOOT: ICD-10-CM

## 2023-04-14 DIAGNOSIS — M20.5X1 OTHER DEFORMITIES OF TOE(S) (ACQUIRED), RIGHT FOOT: ICD-10-CM

## 2023-04-14 DIAGNOSIS — Z88.0 ALLERGY STATUS TO PENICILLIN: ICD-10-CM

## 2023-04-14 DIAGNOSIS — Z98.890 OTHER SPECIFIED POSTPROCEDURAL STATES: Chronic | ICD-10-CM

## 2023-04-14 DIAGNOSIS — Z79.82 LONG TERM (CURRENT) USE OF ASPIRIN: ICD-10-CM

## 2023-04-14 PROCEDURE — 73630 X-RAY EXAM OF FOOT: CPT | Mod: 26,RT

## 2023-04-14 PROCEDURE — 88311 DECALCIFY TISSUE: CPT | Mod: 26

## 2023-04-14 PROCEDURE — 88304 TISSUE EXAM BY PATHOLOGIST: CPT | Mod: 26

## 2023-04-14 PROCEDURE — 88304 TISSUE EXAM BY PATHOLOGIST: CPT

## 2023-04-14 PROCEDURE — 88311 DECALCIFY TISSUE: CPT

## 2023-04-14 PROCEDURE — 73630 X-RAY EXAM OF FOOT: CPT | Mod: RT

## 2023-04-14 RX ORDER — OXYCODONE HYDROCHLORIDE 5 MG/1
5 TABLET ORAL ONCE
Refills: 0 | Status: DISCONTINUED | OUTPATIENT
Start: 2023-04-14 | End: 2023-04-14

## 2023-04-14 RX ORDER — ONDANSETRON 8 MG/1
4 TABLET, FILM COATED ORAL ONCE
Refills: 0 | Status: DISCONTINUED | OUTPATIENT
Start: 2023-04-14 | End: 2023-04-14

## 2023-04-14 RX ORDER — SODIUM CHLORIDE 9 MG/ML
1000 INJECTION, SOLUTION INTRAVENOUS
Refills: 0 | Status: DISCONTINUED | OUTPATIENT
Start: 2023-04-14 | End: 2023-04-14

## 2023-04-14 RX ORDER — HYDROMORPHONE HYDROCHLORIDE 2 MG/ML
0.5 INJECTION INTRAMUSCULAR; INTRAVENOUS; SUBCUTANEOUS
Refills: 0 | Status: DISCONTINUED | OUTPATIENT
Start: 2023-04-14 | End: 2023-04-14

## 2023-04-14 RX ADMIN — SODIUM CHLORIDE 100 MILLILITER(S): 9 INJECTION, SOLUTION INTRAVENOUS at 09:15

## 2023-04-14 NOTE — ASU DISCHARGE PLAN (ADULT/PEDIATRIC) - CALL YOUR DOCTOR IF YOU HAVE ANY OF THE FOLLOWING:
Bleeding that does not stop/Nausea and vomiting that does not stop/Unable to urinate/Excessive diarrhea

## 2023-04-14 NOTE — ASU DISCHARGE PLAN (ADULT/PEDIATRIC) - NS MD DC FALL RISK RISK
For information on Fall & Injury Prevention, visit: [email or web address] OR  [email or web address] OR  https://www.cdc.gov/steadi/patient.html

## 2023-04-14 NOTE — CHART NOTE - NSCHARTNOTEFT_GEN_A_CORE
PACU ANESTHESIA ADMISSION NOTE      Procedure:   Post op diagnosis:      ____  Intubated  TV:______       Rate: ______      FiO2: ______    __x__  Patent Airway    __x__  Full return of protective reflexes    __x__  Full recovery from anesthesia / back to baseline     Vitals:   T:  36         R: 20                 BP: 110/51                 Sat: 97                  P: 60      Mental Status:  __x__ Awake   ___x__ Alert   _____ Drowsy   _____ Sedated    Nausea/Vomiting:  _x___ NO  ______Yes,   See Post - Op Orders          Pain Scale (0-10):  _____    Treatment: __x__ None    ____ See Post - Op/PCA Orders    Post - Operative Fluids:   ____ Oral   ___x_ See Post - Op Orders    Plan: Discharge:   __x__Home       _____Floor     _____Critical Care    _____  Other:_________________    Comments:    Uneventful anesthesia. Patient transported to  spontaneously breathing and hemodynamically stable.

## 2023-04-14 NOTE — PRE-ANESTHESIA EVALUATION ADULT - LAST ECHOCARDIOGRAM
2022 TTE: 1. Left ventricular ejection fraction, by visual estimation, is 30 to 35%. 2. No e/o effusion. 3. Mod MR. 4. normal AV. 5. Normal LA size

## 2023-04-14 NOTE — PRE-ANESTHESIA EVALUATION ADULT - NSANTHOSAYNRD_GEN_A_CORE
No. XIN screening performed.  STOP BANG Legend: 0-2 = LOW Risk; 3-4 = INTERMEDIATE Risk; 5-8 = HIGH Risk
No. XIN screening performed.  STOP BANG Legend: 0-2 = LOW Risk; 3-4 = INTERMEDIATE Risk; 5-8 = HIGH Risk

## 2023-04-14 NOTE — ASU PREOP CHECKLIST - PATIENT'S PERSONAL PROPERTY REMOVED
earings x 3 unable remove/glasses/jewelry
Sibling  Still living? Yes, Estimated age: 31-40  Family history of ovarian cancer, Age at diagnosis: Age Unknown     Father  Still living? No  Family history of hypertension, Age at diagnosis: Age Unknown

## 2023-04-14 NOTE — PRE-ANESTHESIA EVALUATION ADULT - NSANTHPMHFT_GEN_ALL_CORE
METS>4 without CP/palpitations/sob  Denies orthopnea  LHC - no interventions needed METS>4 without CP/palpitations/sob  Denies orthopnea  LHC - no interventions needed, nonobstructive CAD  EF 35%, stable

## 2023-04-19 PROBLEM — Z86.79 PERSONAL HISTORY OF OTHER DISEASES OF THE CIRCULATORY SYSTEM: Chronic | Status: ACTIVE | Noted: 2023-03-24

## 2023-04-27 LAB — SURGICAL PATHOLOGY STUDY: SIGNIFICANT CHANGE UP

## 2023-04-28 ENCOUNTER — OUTPATIENT (OUTPATIENT)
Dept: OUTPATIENT SERVICES | Facility: HOSPITAL | Age: 73
LOS: 1 days | End: 2023-04-28
Payer: MEDICARE

## 2023-04-28 VITALS
DIASTOLIC BLOOD PRESSURE: 67 MMHG | HEIGHT: 65 IN | WEIGHT: 156.09 LBS | TEMPERATURE: 99 F | HEART RATE: 62 BPM | RESPIRATION RATE: 16 BRPM | SYSTOLIC BLOOD PRESSURE: 148 MMHG | OXYGEN SATURATION: 99 %

## 2023-04-28 DIAGNOSIS — Z98.890 OTHER SPECIFIED POSTPROCEDURAL STATES: Chronic | ICD-10-CM

## 2023-04-28 DIAGNOSIS — M20.22 HALLUX RIGIDUS, LEFT FOOT: ICD-10-CM

## 2023-04-28 DIAGNOSIS — Z01.818 ENCOUNTER FOR OTHER PREPROCEDURAL EXAMINATION: ICD-10-CM

## 2023-04-28 LAB
ALBUMIN SERPL ELPH-MCNC: 4.9 G/DL — SIGNIFICANT CHANGE UP (ref 3.5–5.2)
ALP SERPL-CCNC: 93 U/L — SIGNIFICANT CHANGE UP (ref 30–115)
ALT FLD-CCNC: 17 U/L — SIGNIFICANT CHANGE UP (ref 0–41)
ANION GAP SERPL CALC-SCNC: 10 MMOL/L — SIGNIFICANT CHANGE UP (ref 7–14)
APTT BLD: 31.7 SEC — SIGNIFICANT CHANGE UP (ref 27–39.2)
AST SERPL-CCNC: 21 U/L — SIGNIFICANT CHANGE UP (ref 0–41)
BASOPHILS # BLD AUTO: 0.06 K/UL — SIGNIFICANT CHANGE UP (ref 0–0.2)
BASOPHILS NFR BLD AUTO: 0.8 % — SIGNIFICANT CHANGE UP (ref 0–1)
BILIRUB SERPL-MCNC: 0.6 MG/DL — SIGNIFICANT CHANGE UP (ref 0.2–1.2)
BUN SERPL-MCNC: 27 MG/DL — HIGH (ref 10–20)
CALCIUM SERPL-MCNC: 10.4 MG/DL — SIGNIFICANT CHANGE UP (ref 8.4–10.5)
CHLORIDE SERPL-SCNC: 103 MMOL/L — SIGNIFICANT CHANGE UP (ref 98–110)
CO2 SERPL-SCNC: 26 MMOL/L — SIGNIFICANT CHANGE UP (ref 17–32)
CREAT SERPL-MCNC: 0.8 MG/DL — SIGNIFICANT CHANGE UP (ref 0.7–1.5)
EGFR: 78 ML/MIN/1.73M2 — SIGNIFICANT CHANGE UP
EOSINOPHIL # BLD AUTO: 0.27 K/UL — SIGNIFICANT CHANGE UP (ref 0–0.7)
EOSINOPHIL NFR BLD AUTO: 3.4 % — SIGNIFICANT CHANGE UP (ref 0–8)
GLUCOSE SERPL-MCNC: 99 MG/DL — SIGNIFICANT CHANGE UP (ref 70–99)
HCT VFR BLD CALC: 40.9 % — SIGNIFICANT CHANGE UP (ref 37–47)
HGB BLD-MCNC: 14 G/DL — SIGNIFICANT CHANGE UP (ref 12–16)
IMM GRANULOCYTES NFR BLD AUTO: 0.4 % — HIGH (ref 0.1–0.3)
INR BLD: 1.09 RATIO — SIGNIFICANT CHANGE UP (ref 0.65–1.3)
LYMPHOCYTES # BLD AUTO: 2.13 K/UL — SIGNIFICANT CHANGE UP (ref 1.2–3.4)
LYMPHOCYTES # BLD AUTO: 26.8 % — SIGNIFICANT CHANGE UP (ref 20.5–51.1)
MCHC RBC-ENTMCNC: 31.6 PG — HIGH (ref 27–31)
MCHC RBC-ENTMCNC: 34.2 G/DL — SIGNIFICANT CHANGE UP (ref 32–37)
MCV RBC AUTO: 92.3 FL — SIGNIFICANT CHANGE UP (ref 81–99)
MONOCYTES # BLD AUTO: 0.64 K/UL — HIGH (ref 0.1–0.6)
MONOCYTES NFR BLD AUTO: 8 % — SIGNIFICANT CHANGE UP (ref 1.7–9.3)
NEUTROPHILS # BLD AUTO: 4.83 K/UL — SIGNIFICANT CHANGE UP (ref 1.4–6.5)
NEUTROPHILS NFR BLD AUTO: 60.6 % — SIGNIFICANT CHANGE UP (ref 42.2–75.2)
NRBC # BLD: 0 /100 WBCS — SIGNIFICANT CHANGE UP (ref 0–0)
PLATELET # BLD AUTO: 287 K/UL — SIGNIFICANT CHANGE UP (ref 130–400)
PMV BLD: 11.1 FL — HIGH (ref 7.4–10.4)
POTASSIUM SERPL-MCNC: 5.3 MMOL/L — HIGH (ref 3.5–5)
POTASSIUM SERPL-SCNC: 5.3 MMOL/L — HIGH (ref 3.5–5)
PROT SERPL-MCNC: 7.5 G/DL — SIGNIFICANT CHANGE UP (ref 6–8)
PROTHROM AB SERPL-ACNC: 12.5 SEC — SIGNIFICANT CHANGE UP (ref 9.95–12.87)
RBC # BLD: 4.43 M/UL — SIGNIFICANT CHANGE UP (ref 4.2–5.4)
RBC # FLD: 12.6 % — SIGNIFICANT CHANGE UP (ref 11.5–14.5)
SODIUM SERPL-SCNC: 139 MMOL/L — SIGNIFICANT CHANGE UP (ref 135–146)
WBC # BLD: 7.96 K/UL — SIGNIFICANT CHANGE UP (ref 4.8–10.8)
WBC # FLD AUTO: 7.96 K/UL — SIGNIFICANT CHANGE UP (ref 4.8–10.8)

## 2023-04-28 PROCEDURE — 99214 OFFICE O/P EST MOD 30 MIN: CPT | Mod: 25

## 2023-04-28 PROCEDURE — 85025 COMPLETE CBC W/AUTO DIFF WBC: CPT

## 2023-04-28 PROCEDURE — 85730 THROMBOPLASTIN TIME PARTIAL: CPT

## 2023-04-28 PROCEDURE — 36415 COLL VENOUS BLD VENIPUNCTURE: CPT

## 2023-04-28 PROCEDURE — 93005 ELECTROCARDIOGRAM TRACING: CPT

## 2023-04-28 PROCEDURE — 93010 ELECTROCARDIOGRAM REPORT: CPT

## 2023-04-28 PROCEDURE — 80053 COMPREHEN METABOLIC PANEL: CPT

## 2023-04-28 PROCEDURE — 85610 PROTHROMBIN TIME: CPT

## 2023-04-28 NOTE — H&P PST ADULT - SKIN
warm and dry/color normal/normal/no rashes/no ulcers S/p right Foot Gilliam arthroplasty/warm and dry/color normal/no rashes/no ulcers/scars/wound

## 2023-04-28 NOTE — H&P PST ADULT - NEUROLOGICAL
negative normal/sensation intact normal/sensation intact/responds to pain/responds to verbal commands

## 2023-04-28 NOTE — H&P PST ADULT - NSICDXFAMILYHX_GEN_ALL_CORE_FT
FAMILY HISTORY:  Father  Still living? Unknown  Known health problems: none, Age at diagnosis: Age Unknown    Mother  Still living? No  Known health problems: none, Age at diagnosis: Age Unknown    Sibling  Still living? No  Family history of cancer of tongue, Age at diagnosis: Age Unknown

## 2023-04-28 NOTE — H&P PST ADULT - GASTROINTESTINAL
EKG : LBBB/normal/soft/nontender/nondistended/normal active bowel sounds negative normal/soft/nontender/nondistended/normal active bowel sounds

## 2023-04-28 NOTE — H&P PST ADULT - NSICDXPASTSURGICALHX_GEN_ALL_CORE_FT
PAST SURGICAL HISTORY:  H/O breast biopsy     History of bunionectomy of right great toe     History of left heart catheterization (LHC)

## 2023-04-28 NOTE — H&P PST ADULT - REASON FOR ADMISSION
Case Type: OP   Suite: [facility]  Proceduralist: Gee Calzada  Confirmed Surgery Date Time: 05-   PAST Date Time: 04- - 8:30  Procedure: PARSONS ARTHROPLASTY LEFT FOOT  Laterality: Left  Length of Procedure: 60 Minutes  Anesthesia Type: Local Standby

## 2023-04-28 NOTE — H&P PST ADULT - MUSCULOSKELETAL
normal/ROM intact/strength 5/5 bilateral upper extremities/strength 5/5 bilateral lower extremities negative ROM intact/strength 5/5 bilateral upper extremities details…

## 2023-04-28 NOTE — H&P PST ADULT - HISTORY OF PRESENT ILLNESS
72 yr old female states pain B/L feet -bunions -1st toe for many yrs " but they bother me more and I am not able to wear certain shoes because it hurts" presents to PAST in prep  for PARSONS ARTHROPLASTY RIGHT FOOT- 4/14/23 and the same surgery on the LEFT foot on 5/5/2023. Denies COVID S/S. Recd 4 doses vaccine. Verbalized understanding of COVID prevention measures. Exercise andrea 1 FOS -slowly LTD by fatigue and pain -feet.  Anesthesia Alert  YES-Difficult Airway-class 4  NO--History of neck surgery or radiation  NO--Limited ROM of neck  NO--History of Malignant hyperthermia  NO--Personal or family history of Pseudocholinesterase deficiency  NO--Prior Anesthesia Complication  NO--Latex Allergy  NO--Loose teeth  NO--History of Rheumatoid Arthritis  NO--XIN  No Bleeding risk  NO--Other_____  Sariah Gimenez is a 71 yo female with PMH of S/p right foot mcintyre arthroplasty on 04/14/2023, Cardiomyopathy, LBBB, Leaky valve who presents to pretesting for the above procedure due to pain and discomfort on left foot when wearing shoe. Patient state " I am not able to wear certain shoes because it hurts."   Patient denies any cp, sob, palpitations, fever, cough, URI, abdominal pains, N/V, UTI, Rashes or open wounds.  As per patient exercise tolerance of 1-2 fos walks with out sob  Patient denies any s/s covid 19 and reports no contact with known positive people. Patient instructed to continue to self monitor and report any concerns to MD. Pt will continue to practice self isolation and  exposure control measures pre op.  Anesthesia Alert  NO--Difficult Airway  NO--History of neck surgery or radiation  NO--Limited ROM of neck  NO--History of Malignant hyperthermia  NO--Personal or family history of Pseudocholinesterase deficiency  NO--Prior Anesthesia Complication  NO--Latex Allergy  NO--Loose teeth  NO--History of Rheumatoid Arthritis  NO--XIN  NO- Risk of Bleedings   Pt instructed to stop vitamins/supplements/herbal medications for one week prior to surgery  As per patient this is the complete medical, surgical history and medications.

## 2023-04-29 DIAGNOSIS — M20.22 HALLUX RIGIDUS, LEFT FOOT: ICD-10-CM

## 2023-04-29 DIAGNOSIS — Z01.818 ENCOUNTER FOR OTHER PREPROCEDURAL EXAMINATION: ICD-10-CM

## 2023-05-01 ENCOUNTER — TRANSCRIPTION ENCOUNTER (OUTPATIENT)
Age: 73
End: 2023-05-01

## 2023-05-01 ENCOUNTER — OUTPATIENT (OUTPATIENT)
Dept: INPATIENT UNIT | Facility: HOSPITAL | Age: 73
LOS: 1 days | Discharge: HOME | End: 2023-05-01
Payer: MEDICARE

## 2023-05-01 ENCOUNTER — RESULT REVIEW (OUTPATIENT)
Age: 73
End: 2023-05-01

## 2023-05-01 VITALS
SYSTOLIC BLOOD PRESSURE: 143 MMHG | WEIGHT: 156.09 LBS | HEART RATE: 64 BPM | OXYGEN SATURATION: 100 % | HEIGHT: 65 IN | DIASTOLIC BLOOD PRESSURE: 65 MMHG | TEMPERATURE: 96 F | RESPIRATION RATE: 18 BRPM

## 2023-05-01 VITALS — SYSTOLIC BLOOD PRESSURE: 119 MMHG | HEART RATE: 60 BPM | RESPIRATION RATE: 17 BRPM | DIASTOLIC BLOOD PRESSURE: 50 MMHG

## 2023-05-01 DIAGNOSIS — Z98.890 OTHER SPECIFIED POSTPROCEDURAL STATES: Chronic | ICD-10-CM

## 2023-05-01 DIAGNOSIS — Z88.0 ALLERGY STATUS TO PENICILLIN: ICD-10-CM

## 2023-05-01 DIAGNOSIS — M21.612 BUNION OF LEFT FOOT: ICD-10-CM

## 2023-05-01 DIAGNOSIS — M20.22 HALLUX RIGIDUS, LEFT FOOT: ICD-10-CM

## 2023-05-01 PROCEDURE — 88300 SURGICAL PATH GROSS: CPT | Mod: 26

## 2023-05-01 PROCEDURE — 73630 X-RAY EXAM OF FOOT: CPT | Mod: 26,LT

## 2023-05-01 PROCEDURE — 73630 X-RAY EXAM OF FOOT: CPT | Mod: LT

## 2023-05-01 PROCEDURE — 88300 SURGICAL PATH GROSS: CPT

## 2023-05-01 RX ORDER — SPIRONOLACTONE 25 MG/1
1 TABLET, FILM COATED ORAL
Qty: 0 | Refills: 0 | DISCHARGE

## 2023-05-01 RX ORDER — HYDROMORPHONE HYDROCHLORIDE 2 MG/ML
0.5 INJECTION INTRAMUSCULAR; INTRAVENOUS; SUBCUTANEOUS
Refills: 0 | Status: DISCONTINUED | OUTPATIENT
Start: 2023-05-01 | End: 2023-05-01

## 2023-05-01 RX ORDER — CARVEDILOL PHOSPHATE 80 MG/1
1 CAPSULE, EXTENDED RELEASE ORAL
Qty: 0 | Refills: 0 | DISCHARGE

## 2023-05-01 RX ORDER — SODIUM CHLORIDE 9 MG/ML
1000 INJECTION, SOLUTION INTRAVENOUS
Refills: 0 | Status: DISCONTINUED | OUTPATIENT
Start: 2023-05-01 | End: 2023-05-01

## 2023-05-01 RX ORDER — SIMVASTATIN 20 MG/1
1 TABLET, FILM COATED ORAL
Qty: 0 | Refills: 0 | DISCHARGE

## 2023-05-01 RX ORDER — ASPIRIN/CALCIUM CARB/MAGNESIUM 324 MG
1 TABLET ORAL
Qty: 0 | Refills: 0 | DISCHARGE

## 2023-05-01 RX ORDER — LOSARTAN POTASSIUM 100 MG/1
1 TABLET, FILM COATED ORAL
Qty: 0 | Refills: 0 | DISCHARGE

## 2023-05-01 NOTE — CHART NOTE - NSCHARTNOTEFT_GEN_A_CORE
PACU ANESTHESIA ADMISSION NOTE      Procedure:  mcintyre arthroplasty left foot    ____  Intubated  TV:______       Rate: ______      FiO2: ______    ___x_  Patent Airway    __x__  Full return of protective reflexes    __x__  Full recovery from anesthesia / back to baseline     Vitals:   T:    97.4    R:   11           BP:   119/59           Sat:        98       P: 68      Mental Status:  ___x_ Awake   _____ Alert   _____ Drowsy   _____ Sedated    Nausea/Vomiting:  __x__ NO  ______Yes,   See Post - Op Orders          Pain Scale (0-10):  _0____    Treatment: ____ None    ____ See Post - Op/PCA Orders    Post - Operative Fluids:   ____ Oral   ___x_ See Post - Op Orders    Plan: Discharge:   __x__Home       _____Floor     _____Critical Care    _____  Other:_________________    Comments:

## 2023-05-01 NOTE — ASU PATIENT PROFILE, ADULT - NSICDXPASTSURGICALHX_GEN_ALL_CORE_FT
PAST SURGICAL HISTORY:  H/O breast biopsy     History of bunionectomy of right great toe mcintyre arthroplasty    History of left heart catheterization (LHC) no stents

## 2023-05-01 NOTE — ASU PATIENT PROFILE, ADULT - NSICDXPASTMEDICALHX_GEN_ALL_CORE_FT
PAST MEDICAL HISTORY:  Bilateral bunions     Bronchitis     History of cardiomyopathy     History of left bundle branch block (LBBB)     Leaky heart valve

## 2023-05-01 NOTE — ASU DISCHARGE PLAN (ADULT/PEDIATRIC) - CALL YOUR DOCTOR IF YOU HAVE ANY OF THE FOLLOWING:
Bleeding that does not stop/Swelling that gets worse/Wound/Surgical Site with redness, or foul smelling discharge or pus/Nausea and vomiting that does not stop/Unable to urinate/Increased irritability or sluggishness

## 2023-05-01 NOTE — PRE-ANESTHESIA EVALUATION ADULT - NSANTHOSAYNRD_GEN_A_CORE
LVM for pt to call back    No. XIN screening performed.  STOP BANG Legend: 0-2 = LOW Risk; 3-4 = INTERMEDIATE Risk; 5-8 = HIGH Risk

## 2023-05-01 NOTE — ASU DISCHARGE PLAN (ADULT/PEDIATRIC) - NS MD DC FALL RISK RISK
For information on Fall & Injury Prevention, visit: [email or web address] OR  [email or web address] OR  https://www.cdc.gov/steadi/patient.html

## 2023-05-03 LAB — SURGICAL PATHOLOGY STUDY: SIGNIFICANT CHANGE UP

## 2023-05-25 ENCOUNTER — APPOINTMENT (OUTPATIENT)
Dept: CARDIOLOGY | Facility: CLINIC | Age: 73
End: 2023-05-25
Payer: MEDICARE

## 2023-05-25 ENCOUNTER — NON-APPOINTMENT (OUTPATIENT)
Age: 73
End: 2023-05-25

## 2023-05-25 VITALS
HEIGHT: 65 IN | BODY MASS INDEX: 26.16 KG/M2 | WEIGHT: 157 LBS | DIASTOLIC BLOOD PRESSURE: 66 MMHG | SYSTOLIC BLOOD PRESSURE: 124 MMHG

## 2023-05-25 PROBLEM — M21.611 BUNION OF RIGHT FOOT: Chronic | Status: ACTIVE | Noted: 2023-05-01

## 2023-05-25 PROCEDURE — 99214 OFFICE O/P EST MOD 30 MIN: CPT

## 2023-05-25 NOTE — DISCUSSION/SUMMARY
[FreeTextEntry1] :   [place] 3/21 alcohol withdrawal. Told  resume   exercise.    DSE on 3/19 EF 35-44%,mod MR. Blood LDL 72 was 98.Persantine  fixed defect EF 47%. Now on simvastatin. Possible CAD. She retired 17.  She got  3 covid vaccine. Now seeing penelope.  Aware need continued  weight loss.  \par Echo 30-35% mod mr. echo soon.  Aware to resume exercise when cleared by podiatry.\par Lexiscan  ischemia. Cath  coronaries normal . Daughter to  sept. \par Bloods reviewed

## 2023-05-25 NOTE — HISTORY OF PRESENT ILLNESS
[FreeTextEntry1] : She lost 6  llbs.   Had  bunion repair both feet . Last may1 2023. On weight watchers. . No chest pain. No sob. HERNANDEZ not change. She is deconditioned. To resume gym when able.

## 2023-07-10 ENCOUNTER — RX RENEWAL (OUTPATIENT)
Age: 73
End: 2023-07-10

## 2023-07-10 RX ORDER — SIMVASTATIN 10 MG/1
10 TABLET, FILM COATED ORAL
Qty: 90 | Refills: 3 | Status: ACTIVE | COMMUNITY
Start: 2022-07-11 | End: 1900-01-01

## 2023-08-03 ENCOUNTER — APPOINTMENT (OUTPATIENT)
Dept: ORTHOPEDIC SURGERY | Facility: CLINIC | Age: 73
End: 2023-08-03
Payer: MEDICARE

## 2023-08-03 DIAGNOSIS — S39.012A STRAIN OF MUSCLE, FASCIA AND TENDON OF LOWER BACK, INITIAL ENCOUNTER: ICD-10-CM

## 2023-08-03 PROCEDURE — 72100 X-RAY EXAM L-S SPINE 2/3 VWS: CPT

## 2023-08-03 PROCEDURE — 99213 OFFICE O/P EST LOW 20 MIN: CPT

## 2023-08-03 NOTE — IMAGING
[de-identified] : On examination, patient has shoulder height discrepancy. Patient has not palpation over the left lumbar paraspinal muscles and on about the piriformis. Patient has positive straight leg raise. Patient has decreased range of motion to the lumbar spine. Patient has excellent range of motion to her left hip. Good motor strength to the left lower extremity. Neurovascular intact.  Mild antalgic gait.  X-ray of the lumbar spine shows significant degenerative changes on about L4 and L5. No acute fracture or dislocation.

## 2023-08-03 NOTE — HISTORY OF PRESENT ILLNESS
[de-identified] : 73-year-old female here for an evaluation of pain to her left hip, patient states that the pain is started on the posterior aspect of the hip and radiates to the posterior aspect of her leg. She denies any trauma or injury. Patient states that the pain can be known to about 6/10 depending on her activity. Patient states that recently she had bunionectomy bilaterally, her last surgery was on May of this year, patient states that she was using the cam walker boot and she has been favoring her right lower extremity. Patient admits to some improvement of symptoms with a heating pad.  Past medical history hypertension and hypercholesterolemia. Current medications are losartan, carvedilol, simvastatin, spironolactone.  Allergy to penicillin.

## 2023-08-03 NOTE — DISCUSSION/SUMMARY
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  Plan: At this time I advised for muscle relaxant and anti-inflammatories. We discussed medication. Patient was advised for acupuncture, chiropractic care and physical therapy. Prescription for physical therapy on patient's chart.     Follow-up: As needed

## 2023-09-28 ENCOUNTER — APPOINTMENT (OUTPATIENT)
Dept: CARDIOLOGY | Facility: CLINIC | Age: 73
End: 2023-09-28

## 2023-10-04 ENCOUNTER — APPOINTMENT (OUTPATIENT)
Dept: CARDIOLOGY | Facility: CLINIC | Age: 73
End: 2023-10-04
Payer: MEDICARE

## 2023-10-04 VITALS
OXYGEN SATURATION: 99 % | HEIGHT: 65 IN | HEART RATE: 65 BPM | BODY MASS INDEX: 26.33 KG/M2 | SYSTOLIC BLOOD PRESSURE: 149 MMHG | WEIGHT: 158 LBS | TEMPERATURE: 98.3 F | DIASTOLIC BLOOD PRESSURE: 69 MMHG

## 2023-10-04 PROCEDURE — 99214 OFFICE O/P EST MOD 30 MIN: CPT

## 2023-10-04 PROCEDURE — 93000 ELECTROCARDIOGRAM COMPLETE: CPT

## 2023-10-04 PROCEDURE — G0404: CPT

## 2023-10-12 NOTE — PRE-ANESTHESIA EVALUATION ADULT - NSANTHOBSERVEDRD_ENT_A_CORE
Service:   Service: Genetics     Consult:  Consult requested by (Attending Name): Requested  by: NICU   Reason: For: cholestasis, hypercalcemia, hypophosphatemia,  hypoglycemia     History of Present Illness:   HPI:    CADENCE RODRIGUEZ is a 2 month old Female    Cadence Rodriguez is a 26 3/7 SGA female now cGA 38.1, DOL 82 with diagnosis of:   - extreme prematurity, ELBW,   - respiratory failure 2/2 BPD intubated on ventilator, apnea of prematurity,   - anemia of prematurity, growth/nutrition,   - metabolic bone disease,   - hypoglycemia, and   - direct hyperbilirubinemia.     Oxygenation and ventilation and appearance of lungs on imaging are slowly improving.     Cholestasis is worsening despite ursodiol and with so-far normal work-up. Sent [facility] cholestasis panel in consult with GI and genetics to evaluate for genetic cause of cholestasis such as Alagille syndrome. HIDA scan ordered.     Cadence Johnston is on multiple supplements for metabolic bone disease and wrist xray suggested healing rickets. Endocrine is concerned she may have a primary endocrine process.     We were consulted for a potential uniforming cause of her symptoms.     Review of Systems:    Constitutional: extreme PTB, ELBW  Head and Neck: No concerns reported  Eyes:  ROP  ENT:  No concerns reported  Respiratory:  Apnea for prematurity, BPD   Cardiovascular: No concerns reported  Gastrointestinal:  Direct hyperbilirubinemia  Genitourinary:  No concerns reported  Reproductive/Endocrine:  Elevated ALP, Hyponatremia, hypophosphatemia, hypokalemia c/f metabolic bone disease  Musculoskeletal:  Elevated ALP, Hyponatremia, hypophosphatemia, hypokalemia c/f metabolic bone disease  Hematology/Lymphatic:  No concerns reported  Skin: No concerns reported  Neurological:  No concerns reported  Psychiatric: No concerns reported       Pertinent positive and negative ROS are listed in HPI, PMH and above, otherwise reviewed in detail for 15 systems and negative              Allergies:  ·  No Known Allergies :     Objective:     Objective Information:        T   P  R  BP   MAP  SpO2   Value  37.2  150  43  76/39   56  92%  Date/Time 1/30 15:00 1/30 15:00 1/30 15:00 1/30 15:00  1/30 15:00 1/30 15:30  Range  (36.9C - 37.5C )  (123 - 168 )  (24 - 70 )  (58 - 76 )/ (28 - 49 )  (40 - 59 )  (88% - 99% )   As of 30-Jan-2023 15:00:00, patient is on 40% oxygen via ventilator assisted.  Highest temp of 37.5 C was recorded at 1/29 14:00      ---- Intake and Output  -----  Mn/Dy/Year Time  Intake   Output  Net  Jan 30, 2023 2:00 pm  55   68  -13  Jan 30, 2023 6:00 am  44   33  11  Jan 29, 2023 10:00 pm  88   68  20    The Intake and Output Totals for the last 24 hours are:      Intake   Output  Net      220   146  74        Vent Settings  1/30 15:29 PEEP (cm H2O)  7  1/30 14:17 Modes  SIMV,  PC,  VG  1/30 14:17 Rate Set (breaths/min)  22  1/30 14:17 Tidal Volume Set (mL)  16  1/30 14:17 Pressure Support (cm H2O)  12  1/30 14:17 FiO2 (%)  40    Vent Data  1/30 14:17 Start Date  06-Jan-2023 1/30 14:17 Start Time  02:02  1/30 14:17 Ventilator Days and Hours  24 Day(s) 12 Hours      Non-Invasive  1/30 14:17 High Inspiratory Pressure (cm H2O)  40  1/30 14:17 Low Inspiratory Pressure (cm H2O)  9    Physical Exam Narrative:  ·  Physical Exam:    General: Intubated, sleeping comfortably  Skin: No rashes or jaundice. Warm and dry.  Hair: No abnormal texture and distribution    Head Shape: Anterior fontanelle widely open  Face:  no asymmetry for facial expression  Eyes:  No hypertelorism, no periorbital edema,   Ears:  set and rotation within normal limit  Nose: no apparent dysmorphology found  Mouth: intubated  Neck:  no webbing    Chest: Under ventilator support. Breath sounds clear.   Heart: Warm and well-perfused without cyanosis.  Abdomen:  Soft and flat. No TTP. No guarding, no rebound pain.   Back:  Deferred  Genitalia: Deferred  Extremities:  Limited with lines and monitors.  N  Neurological: Intubated and sedated. There were no involuntary movements or tremors.   Psychiatric: intubated and sedated.    Medications:    Medications:      CARDIOVASCULAR AGENTS:    1. hydroCHLOROthiazide  Oral Liquid - PEDS:  3  mg  NG/OG Tube  Every 12 Hours      CENTRAL NERVOUS SYSTEM AGENTS:    1. Morphine   0.4 mg/mL Oral Liquid  - JAKE:  0.2  mg  NG/OG Tube  Every 3 Hours    2. PHENobarbital  Oral Liquid - PEDS:  8  mg  Oral  Every 24 Hours    3. Midazolam  Oral Liquid - PEDS:  0.3  mg  NG/OG Tube  Every 3 Hours    4. Caffeine  Citrate Oral Liquid - PEDS:  12  mg  NG/OG Tube  Every 24 Hours      GASTROINTESTINAL AGENTS:    1. Calcium  Carbonate Oral Liquid - PEDS:  19  mg Elemental Calcium  NG/OG Tube  Every 6 Hours    2. Ursodiol  Oral Liquid - PEDS:  15  mg  Oral  Every 12 Hours      HORMONES/HORMONE MODIFIERS:    1. prednisoLONE  Oral Liquid - PEDS:  1.1  mg  NG/OG Tube  Every 12 Hours      NUTRITIONAL PRODUCTS:    1. Ferrous  Sulfate 15 mg Elemental Iron/ mL Oral Liquid - PEDS:  3  mg Elemental Iron  NG/OG Tube  Every 12 Hours    2. Potassium  Chloride Oral Liquid - PEDS:  1.5  mEq  Oral  Every 6 Hours    3. Sodium  Chloride 0.9% Injectable Flush - PEDS:  1  mL  IntraVenous Flush  Every 8 Hours and as Needed   PRN       4. Sodium  Phosphate Oral Liquid - PEDS:  0.4  mmol  Oral  Every 6 Hours    5. Fat  Soluble Multivitamin Oral Liquid - PEDS:  1  mL  NG/OG Tube  Every 24 Hours      TOPICAL AGENTS:    1. Emollient  Topical Cream - PEDS:  1  application(s)  Topical  3 Times a Day   PRN             Conditional Medication Orders       --------------------------------    1. Sodium  Chloride Nasal Gel - PEDS:  1  application(s)  Each Nostril  Every 6 Hours   PRN         Recent Lab Results:    Results:        I have reviewed these laboratory results:    Phosphorus, Urine Spot  30-Jan-2023 14:50:00      Result Value    Phosphorus Urine Spot  96    Phos/Creat Ratio  3750    Creatinine, Urine Spot  25.6       Calcium, Urine Spot  30-Jan-2023 14:50:00      Result Value    Calcium Urine Spot  6.3    Calcium/Creat Ratio  246    Creatinine, Urine Spot  25.6      Complete Blood Count + Differential  30-Jan-2023 13:01:00      Result Value    White Blood Cell Count  12.0    Nucleated Erythrocyte Count  3.6    Red Blood Cell Count  3.02   L   HGB  10.3    HCT  32.3    MCV  107    MCHC  31.9    PLT  273    RDW-CV  30.5   H   Neutrophil %  34.0    Immature Granulocytes %  0.8    Lymphocyte %  49.1    Monocyte %  14.4    Eosinophil %  1.4    Basophil %  0.3    Neutrophil Count  4.07    Lymphocyte Count  5.88    Monocyte Count  1.72   H   Eosinophil Count  0.17    Basophil Count  0.03      Hepatic Function Panel  Trending View      Result 30-Jan-2023 12:39:00  26-Jan-2023 05:26:00    Aspartate Transaminase, Serum 254   H   277   H    ALB 3.3   3.3    T Bili 12.5   H   19.8   H    Bilirubin, Serum Direct - Conjugated 5.4   H   12.9   H    ALKP 2471   H   3089   H    Alanine Aminotransferase, Serum 65   H   84   H    T Pro 4.0   L   4.2   L        Renal Function Panel  Trending View      Result 30-Jan-2023 12:39:00  26-Jan-2023 22:17:00    Glucose, Serum 79   64       137    K 5.8   4.6    CL 97   L   98    Bicarbonate, Serum 31   H   32   H    Anion Gap, Serum 10   12    BUN 9   4    CREAT 0.21   0.25    Calcium, Serum 9.4   9.7    Phosphorus, Serum 4.9   3.9   L    ALB 3.3   3.4        Vitamin D (25-Hydroxy), Level  30-Jan-2023 12:39:00      Result Value    Vitamin D (25-Hydroxy), Level  29   A     Magnesium, Serum  26-Jan-2023 22:17:00      Result Value    Magnesium, Serum  2.16      Hepatitis Panel, Acute (HCFA)  26-Jan-2023 15:28:00      Result Value    Hepatitis A IgM Antibody  NONREACTIVE  Reference Range: NONREACTIVE Biotin interference may cause falsely decreased results. Patients taking a Biotin dose of up to 5 mg/day should refrain  from taking Biotin for 24 hours before sample collection. Providers may contact t     Hepatitis B Core Ab, IgM  NONREACTIVE  Reference Range: NONREACTIVE Results from patients taking biotin supplements or receiving high-dose biotin therapy should be interpreted with caution  due to possible interference with this test. Providers may contact their local l    Hepatitis C-Antibody  NONREACTIVE  Reference Range: NONREACTIVE Results from patients taking biotin supplements or receiving high-dose biotin therapy should be interpreted with caution  due to possible interference with this test. Providers may  contact their local      Gamma Glutamyl Transferase, Serum  26-Jan-2023 05:26:00      Result Value    Gamma Glutamyl Transferase, Serum  96   H     Parathormone Intact, Serum  26-Jan-2023 05:26:00      Result Value    Parathormone Intact, Serum  104.7   H         Assessment:     Cadence Cui is a 26 3/7 SGA female now cGA 38.1, DOL 82 with diagnosis of:   - extreme prematurity, ELBW,   - respiratory failure 2/2 BPD intubated on ventilator, apnea of prematurity,   - anemia of prematurity,   - metabolic bone disease: hypercalcemia, hypophosphatemia, elevated ALP,   - hypoglycemia,   - direct hyperbilirubinemia, cholestasis  To r/o Nick-Brianna syndrome or other GNAS-related syndrome    The involvement of liver and metabolic bone diseases could be from two independent causes, but could be rooted from one common cause. If from one common cause, I would like to rule out Nick-Glenwood or other GNAS-related syndrome first.     All cholestasis panel should have included this gene, but most test is done by NGS with sequencing. However, that might miss the imprinting mechanism of this disease - including microdeletion (or macrodeletion) and UPD of the gene.    Recommendation:   - CMA to detect UPD, microdeletion  - Pursue cholestasis panel         --  Gideon Marshall MD, PhD  Director, Urogenetics Program, Department of Genetics and Genome Sciences  Chief, Urogenetics Division, Urology  [place]   and Attending in Genetics and Urology  [facility], and [facility].      Electronic Signatures:  Gideon Marshall (Wilfred))  (Signed 30-Jan-2023 17:35)   Authored: Service, History of Present Illness, Allergies,  Objective, Assessment/Recommendations, Note Completion      Last Updated: 30-Jan-2023 17:35 by Gideon Marshall (Wilfred))    No

## 2023-11-02 ENCOUNTER — APPOINTMENT (OUTPATIENT)
Dept: CV DIAGNOSITCS | Facility: HOSPITAL | Age: 73
End: 2023-11-02

## 2023-11-03 ENCOUNTER — APPOINTMENT (OUTPATIENT)
Dept: NEUROLOGY | Facility: CLINIC | Age: 73
End: 2023-11-03
Payer: MEDICARE

## 2023-11-03 VITALS — HEIGHT: 65 IN | BODY MASS INDEX: 26.16 KG/M2 | WEIGHT: 157 LBS

## 2023-11-03 PROCEDURE — 99204 OFFICE O/P NEW MOD 45 MIN: CPT

## 2023-11-20 RX ORDER — LOSARTAN POTASSIUM 50 MG/1
50 TABLET, FILM COATED ORAL
Qty: 90 | Refills: 3 | Status: DISCONTINUED | COMMUNITY
Start: 2022-01-05 | End: 2023-11-20

## 2023-11-22 NOTE — ASU PATIENT PROFILE, ADULT - AS SC BRADEN MOISTURE
Daughter called to advise Sharon her mother is seeing another provider now and the refill is all taken care of.    (4) rarely moist

## 2023-12-01 ENCOUNTER — APPOINTMENT (OUTPATIENT)
Dept: NEUROLOGY | Facility: CLINIC | Age: 73
End: 2023-12-01
Payer: MEDICARE

## 2023-12-01 ENCOUNTER — APPOINTMENT (OUTPATIENT)
Dept: MRI IMAGING | Facility: CLINIC | Age: 73
End: 2023-12-01
Payer: MEDICARE

## 2023-12-01 PROCEDURE — 72148 MRI LUMBAR SPINE W/O DYE: CPT

## 2023-12-01 PROCEDURE — 95912 NRV CNDJ TEST 11-12 STUDIES: CPT

## 2023-12-01 PROCEDURE — 95886 MUSC TEST DONE W/N TEST COMP: CPT

## 2023-12-04 ENCOUNTER — APPOINTMENT (OUTPATIENT)
Dept: PAIN MANAGEMENT | Facility: CLINIC | Age: 73
End: 2023-12-04

## 2023-12-04 ENCOUNTER — APPOINTMENT (OUTPATIENT)
Dept: PAIN MANAGEMENT | Facility: CLINIC | Age: 73
End: 2023-12-04
Payer: MEDICARE

## 2023-12-04 PROCEDURE — 99204 OFFICE O/P NEW MOD 45 MIN: CPT

## 2023-12-07 ENCOUNTER — APPOINTMENT (OUTPATIENT)
Dept: NEUROLOGY | Facility: CLINIC | Age: 73
End: 2023-12-07
Payer: MEDICARE

## 2023-12-07 VITALS — HEIGHT: 65 IN | WEIGHT: 158 LBS | BODY MASS INDEX: 26.33 KG/M2

## 2023-12-07 PROCEDURE — 99214 OFFICE O/P EST MOD 30 MIN: CPT

## 2023-12-08 ENCOUNTER — APPOINTMENT (OUTPATIENT)
Dept: CARDIOLOGY | Facility: CLINIC | Age: 73
End: 2023-12-08
Payer: MEDICARE

## 2023-12-08 PROCEDURE — 93306 TTE W/DOPPLER COMPLETE: CPT

## 2023-12-13 ENCOUNTER — APPOINTMENT (OUTPATIENT)
Dept: CARDIOLOGY | Facility: CLINIC | Age: 73
End: 2023-12-13
Payer: MEDICARE

## 2023-12-27 ENCOUNTER — APPOINTMENT (OUTPATIENT)
Dept: CARDIOLOGY | Facility: CLINIC | Age: 73
End: 2023-12-27
Payer: MEDICARE

## 2023-12-27 VITALS
BODY MASS INDEX: 26.82 KG/M2 | WEIGHT: 161 LBS | SYSTOLIC BLOOD PRESSURE: 126 MMHG | DIASTOLIC BLOOD PRESSURE: 80 MMHG | OXYGEN SATURATION: 97 % | HEART RATE: 70 BPM | HEIGHT: 65 IN

## 2023-12-27 DIAGNOSIS — I44.7 LEFT BUNDLE-BRANCH BLOCK, UNSPECIFIED: ICD-10-CM

## 2023-12-27 PROCEDURE — 99213 OFFICE O/P EST LOW 20 MIN: CPT

## 2023-12-27 RX ORDER — DICLOFENAC SODIUM 75 MG/1
75 TABLET, DELAYED RELEASE ORAL
Qty: 60 | Refills: 0 | Status: DISCONTINUED | COMMUNITY
Start: 2023-08-03 | End: 2023-12-27

## 2023-12-27 RX ORDER — MELOXICAM 15 MG/1
15 TABLET ORAL DAILY
Qty: 30 | Refills: 0 | Status: DISCONTINUED | COMMUNITY
Start: 2023-12-04 | End: 2023-12-27

## 2023-12-27 RX ORDER — GABAPENTIN 100 MG/1
100 CAPSULE ORAL 3 TIMES DAILY
Qty: 90 | Refills: 2 | Status: DISCONTINUED | COMMUNITY
Start: 2023-11-03 | End: 2023-12-27

## 2023-12-27 RX ORDER — TIZANIDINE 4 MG/1
4 TABLET ORAL
Qty: 30 | Refills: 0 | Status: DISCONTINUED | COMMUNITY
Start: 2023-08-03 | End: 2023-12-27

## 2023-12-27 NOTE — DISCUSSION/SUMMARY
[FreeTextEntry1] : Pt with PMH CMP w fixed defect persantine . EF then 47%, mod MR. LBBB. Possible CAD on simva .3/19  Dobutamine SE Neg, EF 35-44f%  ECHO  EF 30-35%   3/21 ETOH withdrawal. Pt has no symptoms of HF .She Is having foot surgery under local w SB sedation in amb surg unit by Dr Doran. She needs Adenosine Thal Stress to assess for ischemia with her decreasing EF. Will add spironolactone 25mg  Mon  and ck labs 2 wk after .Consideration for EP eval for ICD for primary prevention, Pt w LBBB. This case completely discussed and reviewed by Dr Douglas and agrees with plan. Narcisoiscan w ischemia Cath  coronaries normal.   10/4/23 Pt needs repeat labs. if ok  Will d/c losartan and start entresto 24/26 twice daily with f/u labs in 2-3 wk. Pt to f/u in approx 1 mth pending response to entresto.. Dr DALY agrees w plan. As pt feels better she should exercise again. She has echo scheduled for Nov.  23 Pt feels well excep for  Severe pain w back pain since July. Had difficulty walking. Has not been going to gym  Back pain managed by  Dr DOMINGUEZ and Dr Almeida epidural is in 1 wk. This has impacted on her QOL and ability to exercise. Feeling well w higher dose entresto. ECHO 23: LVEF: 35% gr I DD, LAE, mild mod MR   Echo reviewed w pt. Will check labs. If acceptable plan 24/26mg one in am two in PM for 1 wk then 2 in AM and 2 in PM. then continue w 48/51mg twice daily. and  possibly Increase spironolactone 25mg one daily. Low potassium diet.  Pt to f/u 3-4 wk for BMP and BP check. Increase spironolactone 25mg one daily.

## 2023-12-27 NOTE — REVIEW OF SYSTEMS
[Joint Pain] : joint pain [Joint Stiffness] : joint stiffness [Fever] : no fever [Chills] : no chills [Blurry Vision] : no blurred vision [Earache] : no earache [Sore Throat] : no sore throat [SOB] : no shortness of breath [Chest Discomfort] : no chest discomfort [Palpitations] : no palpitations [Cough] : no cough [Wheezing] : no wheezing [Abdominal Pain] : no abdominal pain [Diarrhea] : diarrhea [Constipation] : no constipation [Dysuria] : no dysuria [Myalgia] : no myalgia [Rash] : no rash [Skin Lesions] : no skin lesions [Dizziness] : no dizziness [Weakness] : no weakness [Confusion] : no confusion was observed [Swollen Glands] : no swollen glands

## 2023-12-27 NOTE — HISTORY OF PRESENT ILLNESS
[FreeTextEntry1] : 73 y/o with PMH of CMP, LBBB and MR here for Routine f/u. . Pt with h/o a fixed defect with a persantine in 12/14and EF of 47%, in 3/29 it was 35-44% and in 1/22 EF was 30-35%. Pt is completely asymptomatic and without HF symptoms. Pt had Lexiscan + ischemia cath 9/22 normal coronaries Bunion surgery 4/23 wore boot on one foot then another foot caused back pain needed chiropractic was seen by ortho. pt had left sciatica . Her back is improving but she has been unable to exercise. Daughter got  2 wk ago. Pt also had covid in June Pt denies chest pain, shortness of breath,  palpitations,dizziness, syncope or near syncope.  Severe pain w back pain since July. Had difficulty walking. Has not been going to gym  Back pain managed by  Dr DOMINGUEZ and Dr Almeida epidural is in 1 wk. This has impacted on her QOL and ability to exercise. Feeling well w higher dose entresto. Pt denies chest pain, shortness of breath,  palpitations,dizziness, syncope or near syncope.Echo done and reviewed w pt

## 2023-12-28 LAB
ANION GAP SERPL CALC-SCNC: 12 MMOL/L
BUN SERPL-MCNC: 31 MG/DL
CALCIUM SERPL-MCNC: 10.1 MG/DL
CHLORIDE SERPL-SCNC: 103 MMOL/L
CO2 SERPL-SCNC: 25 MMOL/L
CREAT SERPL-MCNC: 1 MG/DL
EGFR: 59 ML/MIN/1.73M2
GLUCOSE SERPL-MCNC: 95 MG/DL
NT-PROBNP SERPL-MCNC: 528 PG/ML
POTASSIUM SERPL-SCNC: 5.4 MMOL/L
SODIUM SERPL-SCNC: 140 MMOL/L

## 2024-01-03 ENCOUNTER — APPOINTMENT (OUTPATIENT)
Dept: PAIN MANAGEMENT | Facility: CLINIC | Age: 74
End: 2024-01-03
Payer: MEDICARE

## 2024-01-03 PROCEDURE — 62323 NJX INTERLAMINAR LMBR/SAC: CPT

## 2024-01-03 NOTE — PROCEDURE
[FreeTextEntry3] : Date of Service: 01/03/2024   Account: [id number]  Patient: MADI TY   YOB: 1950  Age: 73 year  Surgeon:      Abimael Hylton DO  Assistant:    None  Pre-Operative Diagnosis:         Lumbosacral Radiculopathy (M54.16)  Post Operative Diagnosis:       Lumbosacral Radiculopathy (M54.16)     Procedure:             Lumbar interlaminar (L5-S1) epidural steroid injection under fluoroscopic guidance  Anesthesia:            none  This procedure was carried out using fluoroscopic guidance.  The risks and benefits of the procedure were discussed extensively with the patient.  The consent of the patient was obtained and the following procedure was performed. The patient was placed in the prone position on the fluoroscopy table and the area was prepped and draped in a sterile fashion.  A timeout was performed with all essential staff present and the site and side were verified.  The patient was placed in the prone position with a pillow under the abdomen to minimize the lumbar lordosis.  The lumbar area was prepped and draped in a sterile fashion.  Under A/P view with slight cephalad-caudad angulation, the L5-S1 interspace was identified and marked.  Using sterile technique the superficial skin was anesthetized with 1% Lidocaine.  A 20 gauge Tuohy needle was advanced into the epidural space under fluoroscopy using loss of resistance at the L5-S1 level.  After negative aspiration for heme or CSF, an epidurogram was obtained in the A/P fluoroscopic views using 2-3 cc of Omnipaque contrast confirming epidural placement of the needle.  After this, 5 cc of of a mixture of preservative free normal saline and 10mg dexamethasone were injected into the epidural space.   The needle was subsequently removed.  Vital signs remained normal.  Pulse oximeter was used throughout the procedure and the patient's pulse and oxygen saturation remained within normal limits.  The patient tolerated the procedure well.  There were no complications.  The patient was instructed to apply ice over the injection sites for twenty minutes every two hours for the next 24 to 48 hours.  Disposition:       1. The patient was advised to F/U in 1-2 weeks to assess the response to the injection.      2. The patient was also instructed to contact me immediately if there were any concerns related to the procedure performed.    Abimael Hylton DO

## 2024-01-05 ENCOUNTER — APPOINTMENT (OUTPATIENT)
Dept: PAIN MANAGEMENT | Facility: CLINIC | Age: 74
End: 2024-01-05

## 2024-01-17 ENCOUNTER — APPOINTMENT (OUTPATIENT)
Dept: NEUROLOGY | Facility: CLINIC | Age: 74
End: 2024-01-17
Payer: MEDICARE

## 2024-01-17 VITALS — HEART RATE: 53 BPM | SYSTOLIC BLOOD PRESSURE: 127 MMHG | DIASTOLIC BLOOD PRESSURE: 67 MMHG

## 2024-01-17 PROCEDURE — 99213 OFFICE O/P EST LOW 20 MIN: CPT

## 2024-01-17 NOTE — PHYSICAL EXAM

## 2024-01-17 NOTE — ASSESSMENT
[FreeTextEntry1] : Lumbar radiculopathy-improved but still symptomatic and is satisfied with result -Neurological workup completed, no further workup necessary at this time - Can increase gabapentin slowly for effect, warned patient of the potential drowsiness side effect of increasing dosage and she reported understanding - Follow-up with pain management - Follow-up in 6-month, sooner if pain management failed and would like to consider neurosurgical intervention  Total clinician time spent  is  21 minutes including preparing to see the patient, obtaining and/or reviewing and confirming history, performing a medically necessary and appropriate examination, counseling and educating the patient and/or family, documenting clinical information in the HER and communicating and/or referring to other healthcare professionals.   I, Bhargavi Sanchez, Attest that this documentation has been prepared under the direction and in the presence of Provider Gene De La Cruz DO  Thank You for letting me assist in the management of this patient.   Gene De La Cruz DO Board Certified, Neurology

## 2024-01-17 NOTE — HISTORY OF PRESENT ILLNESS
[FreeTextEntry1] : Ms. MADI TY returns to the office for follow-up and his/her prior history and physical have been reviewed and she reports no change since seeing us six weeks ago. She has been seeing us for Polyneuropathy, which she reports of significant improvement. She then followed up with Dr. Hylton for injection therapy treatment which she felt improvement at first with SI joint pain. She is able to stand/ walk for longer periods of time since. She has been able to walk a sufficient amount. She trialed a low dose of Gabapentin 300mg TID which she reports significant improvement. There are no side effects or complaints. She will continue to follow up with Dr. Troy routinely.         Ms. MADI TY returns to the office for follow-up and her prior history and physical have been reviewed and she reports no change since last visit. She has been seeing us for  She feels significant improvement. She has less pain with prolong walking. She is under the care of Dr. Troy with pain management. We started her on a low dose of Gabapentin 300mg TID. She wsa told she has neuropathy in the left leg 20 years ago. Due to her arthritis in the back, we discussed that diabetic neuropathy is not related.  She went for a round of physical therapy and notes afterwards she felt stimulation in the legs.

## 2024-01-23 ENCOUNTER — APPOINTMENT (OUTPATIENT)
Dept: PAIN MANAGEMENT | Facility: CLINIC | Age: 74
End: 2024-01-23
Payer: MEDICARE

## 2024-01-23 PROCEDURE — 99214 OFFICE O/P EST MOD 30 MIN: CPT

## 2024-01-23 PROCEDURE — G2211 COMPLEX E/M VISIT ADD ON: CPT

## 2024-01-23 NOTE — HISTORY OF PRESENT ILLNESS
[FreeTextEntry1] : HPI: Patient is present for chronic low back pain. Pain has been ongoing for many years but has been worsening  in the last 5 months. Pain is located in the low back that travels down the left posterior thigh to the left posterior calf. Patient seen Dr. De La Cruz who ordered an MRI of the lumbar spine and prescribed Gabapentin 100mg 3 x a day and patient is having good relief. Patient has tried home exercise therapy, physical therapy and chiropractic therapy in the past and had minimal relief. Pain is worse with standing and walking better with sitting. Pain is dull/achy in nature, associated with stiffness. The radicular pain is described as sharp, shooting, burning pain. Patient pain is minimal today due to the [id number] and she rates her pain a 4/10 on the pain scale.  TODAY revisit encounter 01-: She is status post a Lumbar interlaminar (L5-S1) epidural steroid injection under fluoroscopic guidance on 01/03/24 which provided her with about 50% sustained relief.   She notes that she is walking better and sitting better, however the pain remains.  Pain is located in the low back that travels down the left posterior thigh to the left posterior calf. Pain is dull/achy in nature, associated with stiffness. The radicular pain is described as dull and achy in nature. She recently saw Dr. De La Cruz and he increased her gabapentin dosage to 300mg TID. Pain is worse when she wakes up in the morning. She rates her pain a 3 out of 10 on the pain scale today. On a bad day her pain reaches a 6-7 out of 10 on the pain scale. Patient denies any bowel or bladder dysfunction, incontinence, or saddle anesthesia.

## 2024-01-23 NOTE — DISCUSSION/SUMMARY
[de-identified] : A discussion regarding available pain management treatment options occurred with the patient. These included interventional, rehabilitative, pharmacological, and alternative modalities. We will proceed with the following:   Interventional treatment options:  1. Patient will proceed with a left L4-5 L5-S1 TFESI with MAC.  The risks, benefits and alternatives of the proposed procedure were explained in detail with the patient.  The risks outlined include, but are not limited to, infection, bleeding, nerve injury, a temporary increase in pain, failure to resolve symptoms, allergic reaction, and possible elevation of blood sugar in diabetics.  All questions were answered to patient's apparent satisfaction, and he/she verbalized an understanding.  sent a message to get medical clearance by Dr. geller before booking the injection.   Rehabilitative options:  -Participation in HEP were discussed and printed.  Medication based treatment options:  - ordered a Medrol Dose Pack 4mg, use as directed for a duration of 6 days. - c/w Gabapentin 300mg TID for a duration of 4 weeks. - c/w Mobic 15mg daily for 2 weeks take one week off after completion if pain continues to persist repeat. We have discussed the risks, benefits, and alternatives NSAID therapy including but not limited to the risk of bleeding, thrombosis, gastric mucosal irritation/ulceration, allergic reaction and kidney dysfunction; the patient verbalizes an understanding.   Follow up 1-2 weeks post injection for assessment of efficacy and further recommendations.  I, Susie Crump, attest that this documentation has been prepared under the direction and in the presence of Provider Abimael Hylton, DO  The documentation recorded by the scribe, in my presence, accurately reflects the service I personally performed, and the decisions made by me with my edits as appropriate.  Best Regards, Abimael Hylton D.O.

## 2024-01-24 ENCOUNTER — OUTPATIENT (OUTPATIENT)
Dept: OUTPATIENT SERVICES | Facility: HOSPITAL | Age: 74
LOS: 1 days | End: 2024-01-24
Payer: MEDICARE

## 2024-01-24 DIAGNOSIS — Z98.890 OTHER SPECIFIED POSTPROCEDURAL STATES: Chronic | ICD-10-CM

## 2024-01-24 DIAGNOSIS — Z12.31 ENCOUNTER FOR SCREENING MAMMOGRAM FOR MALIGNANT NEOPLASM OF BREAST: ICD-10-CM

## 2024-01-24 PROCEDURE — 77063 BREAST TOMOSYNTHESIS BI: CPT | Mod: 26

## 2024-01-24 PROCEDURE — 77067 SCR MAMMO BI INCL CAD: CPT

## 2024-01-24 PROCEDURE — 77067 SCR MAMMO BI INCL CAD: CPT | Mod: 26

## 2024-01-24 PROCEDURE — 77063 BREAST TOMOSYNTHESIS BI: CPT

## 2024-01-24 RX ORDER — SPIRONOLACTONE 25 MG/1
25 TABLET ORAL
Qty: 90 | Refills: 3 | Status: ACTIVE | COMMUNITY
Start: 2022-08-24 | End: 1900-01-01

## 2024-01-24 RX ORDER — SACUBITRIL AND VALSARTAN 24; 26 MG/1; MG/1
24-26 TABLET, FILM COATED ORAL
Qty: 60 | Refills: 3 | Status: DISCONTINUED | COMMUNITY
Start: 2023-11-20 | End: 2024-01-24

## 2024-01-25 DIAGNOSIS — Z12.31 ENCOUNTER FOR SCREENING MAMMOGRAM FOR MALIGNANT NEOPLASM OF BREAST: ICD-10-CM

## 2024-01-31 ENCOUNTER — APPOINTMENT (OUTPATIENT)
Dept: CARDIOLOGY | Facility: CLINIC | Age: 74
End: 2024-01-31
Payer: MEDICARE

## 2024-01-31 VITALS
WEIGHT: 163 LBS | HEART RATE: 60 BPM | HEIGHT: 65 IN | DIASTOLIC BLOOD PRESSURE: 60 MMHG | OXYGEN SATURATION: 96 % | SYSTOLIC BLOOD PRESSURE: 110 MMHG | BODY MASS INDEX: 27.16 KG/M2

## 2024-01-31 PROCEDURE — 99213 OFFICE O/P EST LOW 20 MIN: CPT

## 2024-01-31 RX ORDER — METHYLPREDNISOLONE 4 MG/1
4 TABLET ORAL
Qty: 1 | Refills: 0 | Status: DISCONTINUED | COMMUNITY
Start: 2024-01-23 | End: 2024-01-31

## 2024-02-02 RX ORDER — GABAPENTIN 600 MG/1
600 TABLET, COATED ORAL 3 TIMES DAILY
Qty: 90 | Refills: 5 | Status: ACTIVE | COMMUNITY
Start: 2024-02-02 | End: 1900-01-01

## 2024-02-06 RX ORDER — SACUBITRIL AND VALSARTAN 49; 51 MG/1; MG/1
49-51 TABLET, FILM COATED ORAL TWICE DAILY
Qty: 60 | Refills: 5 | Status: ACTIVE | COMMUNITY
Start: 1900-01-01 | End: 1900-01-01

## 2024-02-12 LAB
ANION GAP SERPL CALC-SCNC: 15 MMOL/L
BUN SERPL-MCNC: 41 MG/DL
CALCIUM SERPL-MCNC: 9.9 MG/DL
CHLORIDE SERPL-SCNC: 98 MMOL/L
CO2 SERPL-SCNC: 26 MMOL/L
CREAT SERPL-MCNC: 1.1 MG/DL
EGFR: 53 ML/MIN/1.73M2
GLUCOSE SERPL-MCNC: 91 MG/DL
POTASSIUM SERPL-SCNC: 5.5 MMOL/L
SODIUM SERPL-SCNC: 139 MMOL/L

## 2024-02-20 ENCOUNTER — APPOINTMENT (OUTPATIENT)
Dept: PAIN MANAGEMENT | Facility: CLINIC | Age: 74
End: 2024-02-20
Payer: MEDICARE

## 2024-02-20 PROCEDURE — 99214 OFFICE O/P EST MOD 30 MIN: CPT

## 2024-02-20 PROCEDURE — G2211 COMPLEX E/M VISIT ADD ON: CPT

## 2024-02-20 NOTE — HISTORY OF PRESENT ILLNESS
[FreeTextEntry1] : HPI: Patient is present for chronic low back pain. Pain has been ongoing for many years but has been worsening  in the last 5 months. Pain is located in the low back that travels down the left posterior thigh to the left posterior calf. Patient seen Dr. De La Cruz who ordered an MRI of the lumbar spine and prescribed Gabapentin 100mg 3 x a day and patient is having good relief. Patient has tried home exercise therapy, physical therapy and chiropractic therapy in the past and had minimal relief. Pain is worse with standing and walking better with sitting. Pain is dull/achy in nature, associated with stiffness. The radicular pain is described as sharp, shooting, burning pain. Patient pain is minimal today due to the [id number] and she rates her pain a 4/10 on the pain scale.  TODAY revisit encounter 02-: She is most recently status post a Lumbar interlaminar (L5-S1) epidural steroid injection under fluoroscopic guidance on 01/03/24 which provided her with about 50% sustained relief.   She continues today with lower back pain with occasional radiation to the left posterior calf associated with tingling. She notes that she is walking better and sitting better, however the pain remains.  Pain is located in the low back that travels down the left posterior thigh to the left posterior calf. Pain is dull/achy in nature, associated with stiffness. The radicular pain is described as dull and achy in nature. Pain is worse when she wakes up in the morning. She rates her pain a 4 out of 10 on the pain scale today. On a bad day her pain reaches a 6-7 out of 10 on the pain scale. Patient denies any bowel or bladder dysfunction, incontinence, or saddle anesthesia.

## 2024-02-20 NOTE — DISCUSSION/SUMMARY
[de-identified] : A discussion regarding available pain management treatment options occurred with the patient. These included interventional, rehabilitative, pharmacological, and alternative modalities. We will proceed with the following:   Interventional treatment options:  1. Patient will proceed with a left L3-4, L5-S1 TFESI with MAC.  Patient is presenting with radicular pain with impairment in ADLs and functionality. The pain has not responded to conservative care, including medications, stretching, as well as active modalities, such as physical therapy. Imaging studies as well as physical exam findings corroborate the symptomatology and radicular pain. We will proceed with a transforaminal epidural at this point.  The risks, benefits and alternatives of the proposed procedure were explained in detail with the patient.  The risks outlined include, but are not limited to, infection, bleeding, nerve injury, a temporary increase in pain, failure to resolve symptoms, allergic reaction, and possible elevation of blood sugar in diabetics.  All questions were answered to patient's apparent satisfaction, and she verbalized an understanding.  Rehabilitative options:  -Participation in HEP were discussed and printed.  Medication based treatment options:  - c/w Gabapentin 300mg TID for a duration of 4 weeks. - c/w Mobic 15mg daily for 2 weeks take one week off after completion if pain continues to persist repeat. We have discussed the risks, benefits, and alternatives NSAID therapy including but not limited to the risk of bleeding, thrombosis, gastric mucosal irritation/ulceration, allergic reaction and kidney dysfunction; the patient verbalizes an understanding.   Follow up in 4-6 weeks for reassessment.   I, Susie Crump, attest that this documentation has been prepared under the direction and in the presence of Provider Abimael Hylton, DO The documentation recorded by the scribe, in my presence, accurately reflects the service I personally performed, and the decisions made by me with my edits as appropriate.  Best Regards, Abimael Hylton D.O.

## 2024-02-29 ENCOUNTER — RX RENEWAL (OUTPATIENT)
Age: 74
End: 2024-02-29

## 2024-02-29 RX ORDER — GABAPENTIN 300 MG/1
300 CAPSULE ORAL 3 TIMES DAILY
Qty: 90 | Refills: 0 | Status: ACTIVE | COMMUNITY
Start: 2023-12-07 | End: 1900-01-01

## 2024-03-14 ENCOUNTER — APPOINTMENT (OUTPATIENT)
Dept: PAIN MANAGEMENT | Facility: CLINIC | Age: 74
End: 2024-03-14
Payer: MEDICARE

## 2024-03-14 PROCEDURE — 00630 ANES PX LUMBAR REGION NOS: CPT | Mod: QZ,P3

## 2024-03-14 PROCEDURE — 64484 NJX AA&/STRD TFRM EPI L/S EA: CPT | Mod: LT

## 2024-03-14 PROCEDURE — 64483 NJX AA&/STRD TFRM EPI L/S 1: CPT | Mod: LT

## 2024-03-14 NOTE — PROCEDURE
[FreeTextEntry3] : Date of Service: 03/14/2024   Account: [id number]  Patient: MADI TY   YOB: 1950  Age: 73 year   Pre-Operative Diagnosis:   Lumbosacral Radiculopathy (M54.16)  Post Operative Diagnosis: Lumbosacral Radiculopathy (M54.16)  Procedure:             Left L3-4, L5-S1 transforaminal epidural steroid injection under fluoroscopic guidance.  Anesthesia:            MAC  This procedure was carried out using fluoroscopic guidance.  The risks and benefits of the procedure were discussed extensively with the patient.  The consent of the patient was obtained and the following procedure was performed. The patient was placed in the prone position on the fluoroscopy table and the area was prepped and draped in a sterile fashion.  A timeout was performed with all essential staff present and the site and side were verified.  The left L3-4 neural foramen was then identified on left oblique "quinn dog" anatomical view at the 6 o' clock position using fluoroscopic guidance, and the area was marked.   A 25 gauge 3.5 inch spinal needle was directed toward the inferior (6 o'clock) position of the pedicle, which formed the roof of the identified foramen.  Once in the epidural space, after negative aspiration for heme and CSF, 1cc of Omnipaque contrast was injected to confirm epidural location and assess filling defects and rule out intravascular needle placement.  Lumbar epidurogram showed no intravascular or intrathecal flow pattern.  No blood or CSF was aspirated.  Omnipaque spread medially in epidural space and outlined the exiting nerve root.  After this, 3 cc of a mixture of 4cc of preservative free normal saline plus 20mg dexamethasone was injected in the epidural space  The left L5-S1 neural foramen and was then identified on left oblique "quinn dog" anatomical view at the 6 o' clock position using fluoroscopic guidance, and the area was marked.  A 25 gauge 3.5 inch spinal needle was directed toward the inferior (6 o'clock) position of the pedicle, which formed the roof of the identified foramen.  Once in the epidural space, after negative aspiration for heme and CSF, 1cc of Omnipaque contrast was injected to confirm epidural location and assess filling defects and rule out intravascular needle placement.   Lumbar epidurogram showed no intravascular or intrathecal flow pattern.  No blood or CSF was aspirated.  Omnipaque spread medially in epidural space and outlined the exiting nerve root.   After this, the remainder of the injectate listed above was injected in the epidural space.  The needle was subsequently removed.  Vital signs remained normal.  Pulse oximeter was used throughout the procedure and the patient's pulse and oxygen saturation remained within normal limits.  The patient tolerated the procedure well.  There were no complications.  The patient was instructed to apply ice over the injection sites for twenty minutes every two hours for the next 24 to 48 hours.  Disposition:       1. The patient was advised to F/U in 1-2 weeks to assess the response to the injection.      2. The patient was also instructed to contact me immediately if there were any concerns related to the procedure performed.

## 2024-03-26 ENCOUNTER — APPOINTMENT (OUTPATIENT)
Dept: PAIN MANAGEMENT | Facility: CLINIC | Age: 74
End: 2024-03-26
Payer: MEDICARE

## 2024-03-26 PROCEDURE — G2211 COMPLEX E/M VISIT ADD ON: CPT

## 2024-03-26 PROCEDURE — 99214 OFFICE O/P EST MOD 30 MIN: CPT

## 2024-03-26 RX ORDER — METHYLPREDNISOLONE 4 MG/1
4 TABLET ORAL
Qty: 21 | Refills: 59 | Status: ACTIVE | COMMUNITY
Start: 2024-03-26 | End: 1900-01-01

## 2024-03-26 NOTE — DISCUSSION/SUMMARY
[de-identified] : A discussion regarding available pain management treatment options occurred with the patient. These included interventional, rehabilitative, pharmacological, and alternative modalities. We will proceed with the following:   Interventional treatment options:  deferred  Rehabilitative options:  -Participation in HEP were discussed and printed.  Medication based treatment options:  - ordered a Medrol Dose Pack 4mg, use as directed for a duration of 6 days. - c/w Gabapentin 300mg TID for a duration of 4 weeks. - c/w OTC Tylenol as needed.  We have discussed the risks, benefits, and alternatives NSAID therapy including but not limited to the risk of bleeding, thrombosis, gastric mucosal irritation/ulceration, allergic reaction and kidney dysfunction; the patient verbalizes an understanding.   Complementary treatment options: - Patient was advised to stay away from any heavy lifting. If needed, she was advised to squat and not bend forward. - Initiate physician directed activity and lifestyle modifications.  Follow up in 4-6 weeks for reassessment.   I, Susie Crump, attest that this documentation has been prepared under the direction and in the presence of Provider Abimael Hylton, DO The documentation recorded by the scribe, in my presence, accurately reflects the service I personally performed, and the decisions made by me with my edits as appropriate.  Best Regards, Abimael Hylton D.O.

## 2024-03-26 NOTE — HISTORY OF PRESENT ILLNESS
[FreeTextEntry1] : HPI: Patient is present for chronic low back pain. Pain has been ongoing for many years but has been worsening  in the last 5 months. Pain is located in the low back that travels down the left posterior thigh to the left posterior calf. Patient seen Dr. De La Cruz who ordered an MRI of the lumbar spine and prescribed Gabapentin 100mg 3 x a day and patient is having good relief. Patient has tried home exercise therapy, physical therapy and chiropractic therapy in the past and had minimal relief. Pain is worse with standing and walking better with sitting. Pain is dull/achy in nature, associated with stiffness. The radicular pain is described as sharp, shooting, burning pain. Patient pain is minimal today due to the [id number] and she rates her pain a 4/10 on the pain scale.  TODAY 03-: Patient presents to the office today for a follow up visit. She is status post a left L3-4, L5-S1 transforaminal epidural steroid injection under fluoroscopic guidance. on 03/14/24 which provided her with about 60% sustained relief.   She continues today with lower back pain with occasional radiation to the left posterior calf associated with tingling. She notes that she is walking better and sitting better, however the pain remains.  Pain is located in the low back that travels down the left posterior thigh to the left posterior calf. Pain is dull/achy in nature, associated with stiffness and temperature change which is hot. The radicular pain is described as dull and achy in nature. Pain is worse when she wakes up in the morning. She rates her pain a 3 out of 10 on the pain scale today. On a bad day her pain reaches a 6-7 out of 10 on the pain scale. Patient denies any bowel or bladder dysfunction, incontinence, or saddle anesthesia.

## 2024-04-22 ENCOUNTER — RX RENEWAL (OUTPATIENT)
Age: 74
End: 2024-04-22

## 2024-04-22 RX ORDER — CARVEDILOL 25 MG/1
25 TABLET, FILM COATED ORAL
Qty: 180 | Refills: 3 | Status: ACTIVE | COMMUNITY
Start: 2022-01-03 | End: 1900-01-01

## 2024-04-30 ENCOUNTER — APPOINTMENT (OUTPATIENT)
Dept: PAIN MANAGEMENT | Facility: CLINIC | Age: 74
End: 2024-04-30
Payer: MEDICARE

## 2024-04-30 DIAGNOSIS — G89.29 SACROCOCCYGEAL DISORDERS, NOT ELSEWHERE CLASSIFIED: ICD-10-CM

## 2024-04-30 DIAGNOSIS — M54.16 RADICULOPATHY, LUMBAR REGION: ICD-10-CM

## 2024-04-30 DIAGNOSIS — M53.3 SACROCOCCYGEAL DISORDERS, NOT ELSEWHERE CLASSIFIED: ICD-10-CM

## 2024-04-30 PROCEDURE — 99214 OFFICE O/P EST MOD 30 MIN: CPT

## 2024-04-30 RX ORDER — GABAPENTIN 100 MG/1
100 CAPSULE ORAL
Qty: 30 | Refills: 1 | Status: ACTIVE | COMMUNITY
Start: 2024-02-01 | End: 1900-01-01

## 2024-04-30 NOTE — HISTORY OF PRESENT ILLNESS
[FreeTextEntry1] : HPI: Patient is present for chronic low back pain. Pain has been ongoing for many years but has been worsening in the last 5 months. Pain is located in the low back that travels down the left posterior thigh to the left posterior calf. Patient seen Dr. De La Cruz who ordered an MRI of the lumbar spine and prescribed Gabapentin 100mg 3 x a day and patient is having good relief. Patient has tried home exercise therapy, physical therapy and chiropractic therapy in the past and had minimal relief. Pain is worse with standing and walking better with sitting. Pain is dull/achy in nature, associated with stiffness. The radicular pain is described as sharp, shooting, burning pain. Patient pain is minimal today due to the [id number] and she rates her pain a 4/10 on the pain scale.  TODAY 04-: Patient presents to the office today for a follow up visit. She is most recently status post a left L3-4, L5-S1 transforaminal epidural steroid injection under fluoroscopic guidance on 03/14/24 which provided her with about 60% sustained relief. She recently completed a course of oral steroids which provided her with good, sustained relief. She currently rates her pain a 3 out of 10 on the pain scale.   She continues today with lower back pain with occasional radiation to the left posterior calf associated with tingling. She notes that she is walking better and sitting better, however the pain remains.  Pain is located in the low back that travels down the left posterior thigh to the left posterior calf. Pain is dull/achy in nature, associated with stiffness and temperature change which is hot. The radicular pain is described as dull and achy in nature. Pain is worse when she wakes up in the morning. She rates her pain a 3 out of 10 on the pain scale today. On a bad day her pain reaches a 6-7 out of 10 on the pain scale. Patient denies any bowel or bladder dysfunction, incontinence, or saddle anesthesia.

## 2024-04-30 NOTE — DISCUSSION/SUMMARY
[de-identified] : A discussion regarding available pain management treatment options occurred with the patient. These included interventional, rehabilitative, pharmacological, and alternative modalities. We will proceed with the following:   Interventional treatment options:  - deferred at this time.  Rehabilitative options:  - c/w participation in active HEP as discussed.   Medication based treatment options:  - c/w Gabapentin 600mg TID for a duration of 4 weeks. - renewed gabapentin 100mg TAKE 2 TABS AT NIGHT with 1 TAB of 600mg for a total of 800mg at night.  - c/w OTC Tylenol as needed.  We have discussed the risks, benefits, and alternatives NSAID therapy including but not limited to the risk of bleeding, thrombosis, gastric mucosal irritation/ulceration, allergic reaction and kidney dysfunction; the patient verbalizes an understanding.   Complementary treatment options: - Patient was advised to stay away from any heavy lifting. If needed, she was advised to squat and not bend forward. - physician directed activity and lifestyle modifications.  Follow up in 4-6 weeks for reassessment.   I, Susie Crump, attest that this documentation has been prepared under the direction and in the presence of Provider Abimael Hylton, DO The documentation recorded by the scribe, in my presence, accurately reflects the service I personally performed, and the decisions made by me with my edits as appropriate.  Best Regards, Abimael Hylton D.O.

## 2024-06-04 ENCOUNTER — APPOINTMENT (OUTPATIENT)
Dept: CARDIOLOGY | Facility: CLINIC | Age: 74
End: 2024-06-04
Payer: MEDICARE

## 2024-06-04 DIAGNOSIS — I42.8 OTHER CARDIOMYOPATHIES: ICD-10-CM

## 2024-06-04 DIAGNOSIS — G62.9 POLYNEUROPATHY, UNSPECIFIED: ICD-10-CM

## 2024-06-04 DIAGNOSIS — I34.0 NONRHEUMATIC MITRAL (VALVE) INSUFFICIENCY: ICD-10-CM

## 2024-06-04 DIAGNOSIS — I50.20 UNSPECIFIED SYSTOLIC (CONGESTIVE) HEART FAILURE: ICD-10-CM

## 2024-06-04 PROCEDURE — 99214 OFFICE O/P EST MOD 30 MIN: CPT

## 2024-06-04 NOTE — HISTORY OF PRESENT ILLNESS
[FreeTextEntry1] : She gained 5  llbs.   Had  bunion repair both feet . Last may1 2023. On weight watchers. as of today. . No chest pain. No sob. HERNANDEZ does not change. She is deconditioned. She has spinal stenosis. She sees pain management. Started gym again last week.

## 2024-06-04 NOTE — DISCUSSION/SUMMARY
[FreeTextEntry1] :   [place] 3/21 alcohol withdrawal. Told  resume   exercise.    DSE on 3/19 EF 35-44%,mod MR. Blood LDL 72 was 98.Persantine  fixed defect EF 47%. Now on simvastatin. Possible CAD. She retired 17.   Now seeing penelope.  Aware need  weight loss.   Echo 30-35% mod mr. echo soon.   Lexiscan  ischemia. Cath  coronaries normal .  Bloods reviewed on I Phone. Many Questions answered. Time spent 30 minutes Aware need weight loss. See Lexi then start Faxiga.

## 2024-07-08 ENCOUNTER — APPOINTMENT (OUTPATIENT)
Dept: PAIN MANAGEMENT | Facility: CLINIC | Age: 74
End: 2024-07-08
Payer: MEDICARE

## 2024-07-08 DIAGNOSIS — G89.29 PAIN IN RIGHT KNEE: ICD-10-CM

## 2024-07-08 DIAGNOSIS — M25.562 PAIN IN RIGHT KNEE: ICD-10-CM

## 2024-07-08 DIAGNOSIS — G62.9 POLYNEUROPATHY, UNSPECIFIED: ICD-10-CM

## 2024-07-08 DIAGNOSIS — M25.561 PAIN IN RIGHT KNEE: ICD-10-CM

## 2024-07-08 DIAGNOSIS — M54.16 RADICULOPATHY, LUMBAR REGION: ICD-10-CM

## 2024-07-08 PROCEDURE — 99214 OFFICE O/P EST MOD 30 MIN: CPT

## 2024-07-08 PROCEDURE — G2211 COMPLEX E/M VISIT ADD ON: CPT

## 2024-07-10 ENCOUNTER — APPOINTMENT (OUTPATIENT)
Dept: NEUROLOGY | Facility: CLINIC | Age: 74
End: 2024-07-10

## 2024-07-17 ENCOUNTER — APPOINTMENT (OUTPATIENT)
Dept: CARDIOLOGY | Facility: CLINIC | Age: 74
End: 2024-07-17

## 2024-07-22 ENCOUNTER — RX RENEWAL (OUTPATIENT)
Age: 74
End: 2024-07-22

## 2024-07-31 ENCOUNTER — APPOINTMENT (OUTPATIENT)
Dept: CARDIOLOGY | Facility: CLINIC | Age: 74
End: 2024-07-31
Payer: MEDICARE

## 2024-07-31 VITALS
WEIGHT: 167 LBS | BODY MASS INDEX: 27.82 KG/M2 | HEART RATE: 63 BPM | OXYGEN SATURATION: 95 % | SYSTOLIC BLOOD PRESSURE: 110 MMHG | HEIGHT: 65 IN | DIASTOLIC BLOOD PRESSURE: 62 MMHG

## 2024-07-31 DIAGNOSIS — I44.7 LEFT BUNDLE-BRANCH BLOCK, UNSPECIFIED: ICD-10-CM

## 2024-07-31 DIAGNOSIS — I50.20 UNSPECIFIED SYSTOLIC (CONGESTIVE) HEART FAILURE: ICD-10-CM

## 2024-07-31 DIAGNOSIS — I34.0 NONRHEUMATIC MITRAL (VALVE) INSUFFICIENCY: ICD-10-CM

## 2024-07-31 PROCEDURE — 99213 OFFICE O/P EST LOW 20 MIN: CPT

## 2024-07-31 RX ORDER — DAPAGLIFLOZIN 10 MG/1
10 TABLET, FILM COATED ORAL DAILY
Qty: 30 | Refills: 3 | Status: ACTIVE | COMMUNITY
Start: 2024-07-31 | End: 1900-01-01

## 2024-07-31 NOTE — PHYSICAL EXAM
[Normal Gait] : normal gait [S3] : no S3 [S4] : no S4 [Right Carotid Bruit] : no bruit heard over the right carotid [Left Carotid Bruit] : no bruit heard over the left carotid [Right Femoral Bruit] : no bruit heard over the right femoral artery [Left Femoral Bruit] : no bruit heard over the left femoral artery [de-identified] : b/l knee pain

## 2024-07-31 NOTE — DISCUSSION/SUMMARY
[FreeTextEntry1] : Pt with PMH CMP w fixed defect persantine . EF then 47%, mod MR. LBBB. Possible CAD on simva .3/19  Dobutamine SE Neg, EF 35-44f%  ECHO  EF 30-35%   3/21 ETOH withdrawal. Pt has no symptoms of HF .She Is having foot surgery under local w SB sedation in amb surg unit by Dr Doran. She needs Adenosine Thal Stress to assess for ischemia with her decreasing EF. Will add spironolactone 25mg  Mon  and ck labs 2 wk after .Consideration for EP eval for ICD for primary prevention, Pt w LBBB. This case completely discussed and reviewed by Dr Douglas and agrees with plan. Lexiscan w ischemia Cath  coronaries normal.   10/4/23 Pt needs repeat labs. if ok  Will d/c losartan and start entresto 24/26 twice daily with f/u labs in 2-3 wk. Pt to f/u in approx 1 mth pending response to entresto.. Dr DALY agrees w plan. As pt feels better she should exercise again. She has echo scheduled for Nov.  23 Pt feels well excep for  Severe pain w back pain since July. Had difficulty walking. Has not been going to gym  Back pain managed by  Dr DOMINGUEZ and Dr Almeida epidural is in 1 wk. This has impacted on her QOL and ability to exercise. Feeling well w higher dose entresto. ECHO 23: LVEF: 35% gr I DD, LAE, mild mod MR  small pericardial effusion.  Echo reviewed w pt. Will check labs. If acceptable plan 24/26mg one in am two in PM for 1 wk then 2 in AM and 2 in PM. then continue w 48/51mg twice daily. and  possibly Increase spironolactone 25mg one daily. Low potassium diet.  Pt to f/u 3-4 wk for BMP and BP check. Increase spironolactone 25mg one daily. 24 Severe pain w back pain since July. Had difficulty walking. Has not been going to gym  Back pain managed by  Dr DOMINGUEZ and Dr Almeida This has impacted on her QOL and ability to exercise. Feeling well w higher dose entresto. Pt denies chest pain, shortness of breath,  palpitations,dizziness, syncope or near syncope.  Pt has chronic left SI joint pain and lumbar radiculopathy. Dr Hylton would like to do a TRESI w MAC (transforaminal lumbar epidural steroid injection under Monitored anesthesia care).She recently completed a pulsed dose of oral steroids with improvement  of her sciatic pain. She is on entresto 49/51 twice daily and spironolactone 25mg daily will ck BMP today. Use of farxiga in future d/w pt.   Case reviewed w Dr DALY who added to his note on  Cardiac risk epidural infection low. Pt can stop ASA for 5 days prior if needed. Pt cardiac condition remains stable. When pt is able she is to begin cardio exercise as recommended by spine MD. 24 Pt  feels well except for b/l knee pain Synvisc is pending. She consents to farxiga after long discussion re risk/benefit . Pt needs repeat labs, then start farxiga and will needs renal fx 4 wk after. She should f/u w us then. Pt needs weight loss and exercise when able

## 2024-07-31 NOTE — REVIEW OF SYSTEMS
[Weight Loss (___ Lbs)] : [unfilled] ~Ulb weight loss [Fever] : no fever [Chills] : no chills [Blurry Vision] : no blurred vision [Earache] : no earache [Sore Throat] : no sore throat [SOB] : no shortness of breath [Chest Discomfort] : no chest discomfort [Palpitations] : no palpitations [Cough] : no cough [Wheezing] : no wheezing [Abdominal Pain] : no abdominal pain [Diarrhea] : diarrhea [Constipation] : no constipation [Dysuria] : no dysuria [Myalgia] : no myalgia [Rash] : no rash [Skin Lesions] : no skin lesions [Dizziness] : no dizziness [Weakness] : no weakness [Confusion] : no confusion was observed [Swollen Glands] : no swollen glands [FreeTextEntry9] : Knee pain b/l  back better

## 2024-07-31 NOTE — HISTORY OF PRESENT ILLNESS
[FreeTextEntry1] : 73 y/o with PMH of CMP, LBBB and MR here for Routine f/u. . Pt with h/o a fixed defect with a persantine in 12/14and EF of 47%, in 3/29 it was 35-44% and in 1/22 EF was 30-35%. Pt is completely asymptomatic and without HF symptoms. Pt had Lexiscan + ischemia ,cath 9/22 normal coronaries. Severe pain w back pain since July. Had difficulty walking. Has not been going to gym  Back pain managed by  Dr DOMINGUEZ and Dr Almeida This has impacted on her QOL and ability to exercise. Feeling well w higher dose entresto (49/51mg twice daily. Spironolactone.25mg daily. Pt  has not had recent labs she is ready to consider using Farxiga. Pt denies chest pain, shortness of breath,  palpitations, dizziness, syncope or near syncope.

## 2024-07-31 NOTE — HISTORY OF PRESENT ILLNESS
[FreeTextEntry1] : 71 y/o with PMH of CMP, LBBB and MR here for Routine f/u. . Pt with h/o a fixed defect with a persantine in 12/14and EF of 47%, in 3/29 it was 35-44% and in 1/22 EF was 30-35%. Pt is completely asymptomatic and without HF symptoms. Pt had Lexiscan + ischemia ,cath 9/22 normal coronaries. Severe pain w back pain since July. Had difficulty walking. Has not been going to gym  Back pain managed by  Dr DOMINGUEZ and Dr Almeida This has impacted on her QOL and ability to exercise. Feeling well w higher dose entresto (49/51mg twice daily. Spironolactone.25mg daily. Pt  has not had recent labs she is ready to consider using Farxiga. Pt denies chest pain, shortness of breath,  palpitations, dizziness, syncope or near syncope.

## 2024-07-31 NOTE — PHYSICAL EXAM
[Normal Gait] : normal gait [S3] : no S3 [S4] : no S4 [Right Carotid Bruit] : no bruit heard over the right carotid [Left Carotid Bruit] : no bruit heard over the left carotid [Right Femoral Bruit] : no bruit heard over the right femoral artery [Left Femoral Bruit] : no bruit heard over the left femoral artery [de-identified] : b/l knee pain

## 2024-08-02 ENCOUNTER — RX RENEWAL (OUTPATIENT)
Age: 74
End: 2024-08-02

## 2024-08-05 ENCOUNTER — RX RENEWAL (OUTPATIENT)
Age: 74
End: 2024-08-05

## 2024-08-09 ENCOUNTER — APPOINTMENT (OUTPATIENT)
Dept: PAIN MANAGEMENT | Facility: CLINIC | Age: 74
End: 2024-08-09

## 2024-08-09 PROCEDURE — G2211 COMPLEX E/M VISIT ADD ON: CPT

## 2024-08-09 PROCEDURE — 99214 OFFICE O/P EST MOD 30 MIN: CPT

## 2024-08-09 NOTE — DISCUSSION/SUMMARY
[de-identified] : A discussion regarding available pain management treatment options occurred with the patient. These included interventional, rehabilitative, pharmacological, and alternative modalities. We will proceed with the following:   Interventional treatment options:  1. Patient will proceed with BL knee Synvisc 2 shot series injection. Risks with the procedure such as bleeding and infection was discussed in detail.  Rehabilitative options:  - c/w participation in active HEP as discussed. (Printed for knees as well) *KNEES: Patient given specific exercises to do including but not limited to side stepping, knee strengthener, quad strengthener, quad stretch, walking.  - Physical therapy of the knees, legs and balance 2-3 times a week for 4-8 weeks stressing a home exercise program of walking, shoulder griddle strengthening, swimming, elliptical , recumbent bike, Reymundo chi and Yoga. Use things that heat like hot shower or icy heat before rehab, exercising and at the beginning of the day, and ice (ice in a bag never directly on the skin) after activity and at the end of the day. (New script given at today's visit)  Medication based treatment options:  - renewed Gabapentin 600mg TID for a duration of 4 weeks. - renewed gabapentin 100mg TAKE 2 TABS AT NIGHT with 1 TAB of 600mg for a total of 800mg at night.  - c/w OTC Tylenol as needed.  We have discussed the risks, benefits, and alternatives NSAID therapy including but not limited to the risk of bleeding, thrombosis, gastric mucosal irritation/ulceration, allergic reaction and kidney dysfunction; the patient verbalizes an understanding.   Complementary treatment options: - Patient was advised to stay away from any heavy lifting. If needed, she was advised to squat and not bend forward. - Physician directed activity and lifestyle modifications.  Follow up in 4-6 weeks for reassessment.   I, Susie Crump, attest that this documentation has been prepared under the direction and in the presence of Provider Abimael Hylton, DO The documentation recorded by the scribe, in my presence, accurately reflects the service I personally performed, and the decisions made by me with my edits as appropriate.  Best Regards, Abimael Hylton D.O.

## 2024-08-09 NOTE — HISTORY OF PRESENT ILLNESS
[FreeTextEntry1] : TODAY 08-: Patient presents to the office today for a follow up visit. She is most recently status post a left L3-4, L5-S1 transforaminal epidural steroid injection under fluoroscopic guidance on 03/14/24 which provided her with about 60% sustained relief.   She continues today with lower back pain with occasional radiation to the left posterior calf associated with tingling. She notes that she is walking better and sitting better, however the pain remains.  Pain is located in the low back that travels down the left posterior thigh to the left posterior calf. Pain is dull/achy in nature, associated with stiffness and temperature change which is hot. The radicular pain is described as dull and achy in nature. Pain is worse when she wakes up in the morning. She rates her pain a 3 out of 10 on the pain scale today. On a bad day her pain reaches a 6-7 out of 10 on the pain scale. Patient denies any bowel or bladder dysfunction, incontinence, or saddle anesthesia.  She continues with BL knee pain, that is dull, achy knee pain worse with standing and walking, better with sitting without associated numbness, tingling or radicular pain. We reviewed the x-ray of her BL knees in detail during today's visit which has been scanned into her chart. Pain is 8/10 in intensity. She has tried NSAIDs, HEP without significant relief. Patient denies locking, clicking, instability, falls.

## 2024-08-09 NOTE — PHYSICAL EXAM
[de-identified] : Lumbar Spine:  + Seated slump on the left  declan's + left side  ttp to b/l medial joint line knees

## 2024-09-13 ENCOUNTER — APPOINTMENT (OUTPATIENT)
Dept: PAIN MANAGEMENT | Facility: CLINIC | Age: 74
End: 2024-09-13
Payer: MEDICARE

## 2024-09-13 ENCOUNTER — NON-APPOINTMENT (OUTPATIENT)
Age: 74
End: 2024-09-13

## 2024-09-13 DIAGNOSIS — M17.0 BILATERAL PRIMARY OSTEOARTHRITIS OF KNEE: ICD-10-CM

## 2024-09-13 DIAGNOSIS — M25.561 PAIN IN RIGHT KNEE: ICD-10-CM

## 2024-09-13 DIAGNOSIS — M25.562 PAIN IN RIGHT KNEE: ICD-10-CM

## 2024-09-13 DIAGNOSIS — G89.29 PAIN IN RIGHT KNEE: ICD-10-CM

## 2024-09-13 PROCEDURE — 20610 DRAIN/INJ JOINT/BURSA W/O US: CPT | Mod: 50

## 2024-09-13 PROCEDURE — 99214 OFFICE O/P EST MOD 30 MIN: CPT | Mod: 25

## 2024-09-13 NOTE — DISCUSSION/SUMMARY
[de-identified] : A discussion regarding available pain management treatment options occurred with the patient. These included interventional, rehabilitative, pharmacological, and alternative modalities. We will proceed with the following:   Interventional treatment options:  1. Patient will proceed with BL knee Synvisc one injection in office today. Risks with the procedure such as bleeding and infection was discussed in detail.  Rehabilitative options:  - c/w participation in active HEP as discussed. (Printed for knees as well) *KNEES: Patient given specific exercises to do including but not limited to side stepping, knee strengthener, quad strengthener, quad stretch, walking.  - Physical therapy of the knees, legs and balance 2-3 times a week for 4-8 weeks stressing a home exercise program of walking, shoulder griddle strengthening, swimming, elliptical , recumbent bike, Reymundo chi and Yoga. Use things that heat like hot shower or icy heat before rehab, exercising and at the beginning of the day, and ice (ice in a bag never directly on the skin) after activity and at the end of the day. (New script given at today's visit)  Medication based treatment options:  - c/w Gabapentin 600mg TID for a duration of 4 weeks. - c/w gabapentin 100mg TAKE 2 TABS AT NIGHT with 1 TAB of 600mg for a total of 800mg at night.  - c/w OTC Tylenol as needed.  We have discussed the risks, benefits, and alternatives NSAID therapy including but not limited to the risk of bleeding, thrombosis, gastric mucosal irritation/ulceration, allergic reaction and kidney dysfunction; the patient verbalizes an understanding.   Complementary treatment options: - Patient was advised to stay away from any heavy lifting. If needed, she was advised to squat and not bend forward. - Physician directed activity and lifestyle modifications.  Follow up in 4-6 weeks for reassessment.   I, Susie Crump, attest that this documentation has been prepared under the direction and in the presence of Provider Abimael Hylton, DO The documentation recorded by the scribe, in my presence, accurately reflects the service I personally performed, and the decisions made by me with my edits as appropriate.  Best Regards, Abimael Hylton D.O.

## 2024-09-13 NOTE — DISCUSSION/SUMMARY
[de-identified] : A discussion regarding available pain management treatment options occurred with the patient. These included interventional, rehabilitative, pharmacological, and alternative modalities. We will proceed with the following:   Interventional treatment options:  1. Patient will proceed with BL knee Synvisc one injection in office today. Risks with the procedure such as bleeding and infection was discussed in detail.  Rehabilitative options:  - c/w participation in active HEP as discussed. (Printed for knees as well) *KNEES: Patient given specific exercises to do including but not limited to side stepping, knee strengthener, quad strengthener, quad stretch, walking.  - Physical therapy of the knees, legs and balance 2-3 times a week for 4-8 weeks stressing a home exercise program of walking, shoulder griddle strengthening, swimming, elliptical , recumbent bike, Reymundo chi and Yoga. Use things that heat like hot shower or icy heat before rehab, exercising and at the beginning of the day, and ice (ice in a bag never directly on the skin) after activity and at the end of the day. (New script given at today's visit)  Medication based treatment options:  - c/w Gabapentin 600mg TID for a duration of 4 weeks. - c/w gabapentin 100mg TAKE 2 TABS AT NIGHT with 1 TAB of 600mg for a total of 800mg at night.  - c/w OTC Tylenol as needed.  We have discussed the risks, benefits, and alternatives NSAID therapy including but not limited to the risk of bleeding, thrombosis, gastric mucosal irritation/ulceration, allergic reaction and kidney dysfunction; the patient verbalizes an understanding.   Complementary treatment options: - Patient was advised to stay away from any heavy lifting. If needed, she was advised to squat and not bend forward. - Physician directed activity and lifestyle modifications.  Follow up in 4-6 weeks for reassessment.   I, Susie Crump, attest that this documentation has been prepared under the direction and in the presence of Provider Abimael Hylton, DO The documentation recorded by the scribe, in my presence, accurately reflects the service I personally performed, and the decisions made by me with my edits as appropriate.  Best Regards, Abimael Hylton D.O.

## 2024-09-13 NOTE — HISTORY OF PRESENT ILLNESS
[FreeTextEntry1] : TODAY 09-: Patient presents to the office today for a follow up visit. She is most recently status post a left L3-4, L5-S1 transforaminal epidural steroid injection under fluoroscopic guidance on 03/14/24 which provided her with about 60% sustained relief.   She continues with BL knee pain, that is dull, achy knee pain worse with standing and walking, better with sitting without associated numbness, tingling or radicular pain. We reviewed the x-ray of her BL knees in detail during today's visit which has been scanned into her chart. Pain is 8/10 in intensity. She has tried NSAIDs, HEP without significant relief. Patient denies locking, clicking, instability, falls.

## 2024-09-13 NOTE — PROCEDURE
[Large Joint Injection] : Large joint injection [Bilateral] : bilaterally of the [Knee] : knee [Pain] : pain [Alcohol] : alcohol [___ cc    1%] : Lidocaine ~Vcc of 1%  [Synvisc-one (48mg)] : 48mg of Synvisc-one [Call if redness, pain or fever occur] : call if redness, pain or fever occur [Apply ice for 15min out of every hour for the next 12-24 hours as tolerated] : apply ice for 15 minutes out of every hour for the next 12-24 hours as tolerated [Previous OTC use and PT nontherapeutic] : patient has tried OTC's including aspirin, Ibuprofen, Aleve, etc or prescription NSAIDS, and/or exercises at home and/or physical therapy without satisfactory response [Patient had decreased mobility in the joint] : patient had decreased mobility in the joint [Risks, benefits, alternatives discussed / Verbal consent obtained] : the risks benefits, and alternatives have been discussed, and verbal consent was obtained

## 2024-10-11 ENCOUNTER — APPOINTMENT (OUTPATIENT)
Dept: PAIN MANAGEMENT | Facility: CLINIC | Age: 74
End: 2024-10-11
Payer: MEDICARE

## 2024-10-11 DIAGNOSIS — M54.16 RADICULOPATHY, LUMBAR REGION: ICD-10-CM

## 2024-10-11 DIAGNOSIS — M17.0 BILATERAL PRIMARY OSTEOARTHRITIS OF KNEE: ICD-10-CM

## 2024-10-11 PROCEDURE — 99213 OFFICE O/P EST LOW 20 MIN: CPT

## 2024-11-12 ENCOUNTER — APPOINTMENT (OUTPATIENT)
Dept: OTOLARYNGOLOGY | Facility: CLINIC | Age: 74
End: 2024-11-12
Payer: MEDICARE

## 2024-11-12 VITALS — HEIGHT: 65 IN | BODY MASS INDEX: 27.49 KG/M2 | WEIGHT: 165 LBS

## 2024-11-12 DIAGNOSIS — H61.23 IMPACTED CERUMEN, BILATERAL: ICD-10-CM

## 2024-11-12 DIAGNOSIS — H90.3 SENSORINEURAL HEARING LOSS, BILATERAL: ICD-10-CM

## 2024-11-12 PROCEDURE — 92557 COMPREHENSIVE HEARING TEST: CPT

## 2024-11-12 PROCEDURE — 99203 OFFICE O/P NEW LOW 30 MIN: CPT | Mod: 25

## 2024-11-12 PROCEDURE — G0268 REMOVAL OF IMPACTED WAX MD: CPT

## 2024-11-12 PROCEDURE — 92567 TYMPANOMETRY: CPT

## 2024-12-02 ENCOUNTER — APPOINTMENT (OUTPATIENT)
Dept: CARDIOLOGY | Facility: CLINIC | Age: 74
End: 2024-12-02
Payer: MEDICARE

## 2024-12-02 VITALS
BODY MASS INDEX: 28.32 KG/M2 | SYSTOLIC BLOOD PRESSURE: 94 MMHG | HEIGHT: 65 IN | WEIGHT: 170 LBS | DIASTOLIC BLOOD PRESSURE: 55 MMHG | HEART RATE: 85 BPM

## 2024-12-02 DIAGNOSIS — I50.20 UNSPECIFIED SYSTOLIC (CONGESTIVE) HEART FAILURE: ICD-10-CM

## 2024-12-02 DIAGNOSIS — I42.8 OTHER CARDIOMYOPATHIES: ICD-10-CM

## 2024-12-02 DIAGNOSIS — I44.7 LEFT BUNDLE-BRANCH BLOCK, UNSPECIFIED: ICD-10-CM

## 2024-12-02 DIAGNOSIS — G62.9 POLYNEUROPATHY, UNSPECIFIED: ICD-10-CM

## 2024-12-02 PROCEDURE — 99214 OFFICE O/P EST MOD 30 MIN: CPT

## 2024-12-02 PROCEDURE — 93000 ELECTROCARDIOGRAM COMPLETE: CPT

## 2024-12-02 PROCEDURE — G2211 COMPLEX E/M VISIT ADD ON: CPT

## 2024-12-06 ENCOUNTER — APPOINTMENT (OUTPATIENT)
Dept: PAIN MANAGEMENT | Facility: CLINIC | Age: 74
End: 2024-12-06
Payer: MEDICARE

## 2024-12-06 DIAGNOSIS — M17.0 BILATERAL PRIMARY OSTEOARTHRITIS OF KNEE: ICD-10-CM

## 2024-12-06 DIAGNOSIS — M25.562 PAIN IN RIGHT KNEE: ICD-10-CM

## 2024-12-06 DIAGNOSIS — M25.561 PAIN IN RIGHT KNEE: ICD-10-CM

## 2024-12-06 DIAGNOSIS — G89.29 PAIN IN RIGHT KNEE: ICD-10-CM

## 2024-12-06 PROCEDURE — 99214 OFFICE O/P EST MOD 30 MIN: CPT | Mod: 25

## 2024-12-06 PROCEDURE — 20610 DRAIN/INJ JOINT/BURSA W/O US: CPT | Mod: 50

## 2024-12-20 ENCOUNTER — APPOINTMENT (OUTPATIENT)
Dept: PAIN MANAGEMENT | Facility: CLINIC | Age: 74
End: 2024-12-20
Payer: MEDICARE

## 2024-12-20 DIAGNOSIS — M54.16 RADICULOPATHY, LUMBAR REGION: ICD-10-CM

## 2024-12-20 DIAGNOSIS — M25.562 PAIN IN RIGHT KNEE: ICD-10-CM

## 2024-12-20 DIAGNOSIS — M25.561 PAIN IN RIGHT KNEE: ICD-10-CM

## 2024-12-20 DIAGNOSIS — G89.29 PAIN IN RIGHT KNEE: ICD-10-CM

## 2024-12-20 PROCEDURE — 99214 OFFICE O/P EST MOD 30 MIN: CPT

## 2024-12-20 RX ORDER — GABAPENTIN 100 MG/1
100 CAPSULE ORAL
Qty: 60 | Refills: 3 | Status: ACTIVE | COMMUNITY
Start: 2024-12-20 | End: 1900-01-01

## 2024-12-20 RX ORDER — GABAPENTIN 600 MG/1
600 TABLET, COATED ORAL
Qty: 30 | Refills: 3 | Status: ACTIVE | COMMUNITY
Start: 2024-12-20 | End: 1900-01-01

## 2025-01-10 ENCOUNTER — APPOINTMENT (OUTPATIENT)
Dept: OTOLARYNGOLOGY | Facility: CLINIC | Age: 75
End: 2025-01-10
Payer: MEDICARE

## 2025-01-10 PROCEDURE — V5010 ASSESSMENT FOR HEARING AID: CPT | Mod: NC

## 2025-01-24 RX ORDER — GABAPENTIN 600 MG/1
600 TABLET, COATED ORAL
Qty: 90 | Refills: 2 | Status: ACTIVE | COMMUNITY
Start: 2025-01-24 | End: 1900-01-01

## 2025-02-03 RX ORDER — SIMVASTATIN 10 MG/1
10 TABLET, FILM COATED ORAL
Qty: 90 | Refills: 3 | Status: ACTIVE | COMMUNITY
Start: 1900-01-01 | End: 1900-01-01

## 2025-02-04 ENCOUNTER — APPOINTMENT (OUTPATIENT)
Dept: OTOLARYNGOLOGY | Facility: CLINIC | Age: 75
End: 2025-02-04

## 2025-02-24 ENCOUNTER — APPOINTMENT (OUTPATIENT)
Dept: PAIN MANAGEMENT | Facility: CLINIC | Age: 75
End: 2025-02-24
Payer: MEDICARE

## 2025-02-24 DIAGNOSIS — G89.29 PAIN IN RIGHT KNEE: ICD-10-CM

## 2025-02-24 DIAGNOSIS — M25.561 PAIN IN RIGHT KNEE: ICD-10-CM

## 2025-02-24 DIAGNOSIS — M25.562 PAIN IN RIGHT KNEE: ICD-10-CM

## 2025-02-24 DIAGNOSIS — M17.0 BILATERAL PRIMARY OSTEOARTHRITIS OF KNEE: ICD-10-CM

## 2025-02-24 PROCEDURE — 99213 OFFICE O/P EST LOW 20 MIN: CPT

## 2025-03-10 ENCOUNTER — APPOINTMENT (OUTPATIENT)
Dept: OTOLARYNGOLOGY | Facility: CLINIC | Age: 75
End: 2025-03-10
Payer: MEDICARE

## 2025-03-10 PROCEDURE — V5261F: CUSTOM

## 2025-03-14 ENCOUNTER — APPOINTMENT (OUTPATIENT)
Dept: PAIN MANAGEMENT | Facility: CLINIC | Age: 75
End: 2025-03-14
Payer: MEDICARE

## 2025-03-14 DIAGNOSIS — M25.562 PAIN IN RIGHT KNEE: ICD-10-CM

## 2025-03-14 DIAGNOSIS — G89.29 PAIN IN RIGHT KNEE: ICD-10-CM

## 2025-03-14 DIAGNOSIS — M17.0 BILATERAL PRIMARY OSTEOARTHRITIS OF KNEE: ICD-10-CM

## 2025-03-14 DIAGNOSIS — M25.561 PAIN IN RIGHT KNEE: ICD-10-CM

## 2025-03-14 PROCEDURE — 20610 DRAIN/INJ JOINT/BURSA W/O US: CPT | Mod: 50

## 2025-03-31 ENCOUNTER — APPOINTMENT (OUTPATIENT)
Dept: OTOLARYNGOLOGY | Facility: CLINIC | Age: 75
End: 2025-03-31
Payer: MEDICARE

## 2025-03-31 PROCEDURE — V5299A: CUSTOM

## 2025-04-17 ENCOUNTER — RX RENEWAL (OUTPATIENT)
Age: 75
End: 2025-04-17

## 2025-04-18 ENCOUNTER — APPOINTMENT (OUTPATIENT)
Dept: PAIN MANAGEMENT | Facility: CLINIC | Age: 75
End: 2025-04-18
Payer: MEDICARE

## 2025-04-18 PROCEDURE — 99213 OFFICE O/P EST LOW 20 MIN: CPT

## 2025-04-21 ENCOUNTER — RX RENEWAL (OUTPATIENT)
Age: 75
End: 2025-04-21

## 2025-04-23 ENCOUNTER — APPOINTMENT (OUTPATIENT)
Dept: PAIN MANAGEMENT | Facility: CLINIC | Age: 75
End: 2025-04-23
Payer: MEDICARE

## 2025-04-23 DIAGNOSIS — M25.562 PAIN IN RIGHT KNEE: ICD-10-CM

## 2025-04-23 DIAGNOSIS — M17.0 BILATERAL PRIMARY OSTEOARTHRITIS OF KNEE: ICD-10-CM

## 2025-04-23 DIAGNOSIS — M25.561 PAIN IN RIGHT KNEE: ICD-10-CM

## 2025-04-23 DIAGNOSIS — G89.29 PAIN IN RIGHT KNEE: ICD-10-CM

## 2025-04-23 PROCEDURE — 77002 NEEDLE LOCALIZATION BY XRAY: CPT | Mod: 79

## 2025-04-23 PROCEDURE — 64450 NJX AA&/STRD OTHER PN/BRANCH: CPT | Mod: 50

## 2025-04-29 ENCOUNTER — APPOINTMENT (OUTPATIENT)
Dept: CARDIOLOGY | Facility: CLINIC | Age: 75
End: 2025-04-29
Payer: MEDICARE

## 2025-04-29 PROCEDURE — 93306 TTE W/DOPPLER COMPLETE: CPT

## 2025-05-05 ENCOUNTER — APPOINTMENT (OUTPATIENT)
Dept: PAIN MANAGEMENT | Facility: CLINIC | Age: 75
End: 2025-05-05

## 2025-05-05 PROCEDURE — 20610 DRAIN/INJ JOINT/BURSA W/O US: CPT | Mod: 50

## 2025-05-05 PROCEDURE — 99213 OFFICE O/P EST LOW 20 MIN: CPT | Mod: 25

## 2025-05-08 ENCOUNTER — APPOINTMENT (OUTPATIENT)
Dept: CARDIOLOGY | Facility: CLINIC | Age: 75
End: 2025-05-08
Payer: MEDICARE

## 2025-05-08 VITALS
HEART RATE: 74 BPM | BODY MASS INDEX: 27.99 KG/M2 | DIASTOLIC BLOOD PRESSURE: 50 MMHG | WEIGHT: 168 LBS | OXYGEN SATURATION: 94 % | SYSTOLIC BLOOD PRESSURE: 100 MMHG | HEIGHT: 65 IN

## 2025-05-08 DIAGNOSIS — I42.8 OTHER CARDIOMYOPATHIES: ICD-10-CM

## 2025-05-08 DIAGNOSIS — I50.20 UNSPECIFIED SYSTOLIC (CONGESTIVE) HEART FAILURE: ICD-10-CM

## 2025-05-08 DIAGNOSIS — I34.0 NONRHEUMATIC MITRAL (VALVE) INSUFFICIENCY: ICD-10-CM

## 2025-05-08 PROCEDURE — 99214 OFFICE O/P EST MOD 30 MIN: CPT

## 2025-05-16 ENCOUNTER — APPOINTMENT (OUTPATIENT)
Dept: PAIN MANAGEMENT | Facility: CLINIC | Age: 75
End: 2025-05-16
Payer: MEDICARE

## 2025-05-16 PROCEDURE — 20610 DRAIN/INJ JOINT/BURSA W/O US: CPT | Mod: 50

## 2025-05-20 ENCOUNTER — RX RENEWAL (OUTPATIENT)
Age: 75
End: 2025-05-20

## 2025-05-21 ENCOUNTER — OUTPATIENT (OUTPATIENT)
Dept: OUTPATIENT SERVICES | Facility: HOSPITAL | Age: 75
LOS: 1 days | End: 2025-05-21
Payer: MEDICARE

## 2025-05-21 DIAGNOSIS — Z98.890 OTHER SPECIFIED POSTPROCEDURAL STATES: Chronic | ICD-10-CM

## 2025-05-21 DIAGNOSIS — Z12.31 ENCOUNTER FOR SCREENING MAMMOGRAM FOR MALIGNANT NEOPLASM OF BREAST: ICD-10-CM

## 2025-05-21 PROCEDURE — 77067 SCR MAMMO BI INCL CAD: CPT

## 2025-05-21 PROCEDURE — 77067 SCR MAMMO BI INCL CAD: CPT | Mod: 26

## 2025-05-21 PROCEDURE — 77063 BREAST TOMOSYNTHESIS BI: CPT | Mod: 26

## 2025-05-21 PROCEDURE — 77063 BREAST TOMOSYNTHESIS BI: CPT

## 2025-05-22 ENCOUNTER — APPOINTMENT (OUTPATIENT)
Dept: PAIN MANAGEMENT | Facility: CLINIC | Age: 75
End: 2025-05-22
Payer: MEDICARE

## 2025-05-22 DIAGNOSIS — Z12.31 ENCOUNTER FOR SCREENING MAMMOGRAM FOR MALIGNANT NEOPLASM OF BREAST: ICD-10-CM

## 2025-05-22 PROCEDURE — 77002 NEEDLE LOCALIZATION BY XRAY: CPT | Mod: 79

## 2025-05-22 PROCEDURE — 64450 NJX AA&/STRD OTHER PN/BRANCH: CPT | Mod: 50

## 2025-05-30 ENCOUNTER — APPOINTMENT (OUTPATIENT)
Dept: PAIN MANAGEMENT | Facility: CLINIC | Age: 75
End: 2025-05-30
Payer: MEDICARE

## 2025-05-30 DIAGNOSIS — M25.561 PAIN IN RIGHT KNEE: ICD-10-CM

## 2025-05-30 DIAGNOSIS — M25.562 PAIN IN RIGHT KNEE: ICD-10-CM

## 2025-05-30 DIAGNOSIS — M17.0 BILATERAL PRIMARY OSTEOARTHRITIS OF KNEE: ICD-10-CM

## 2025-05-30 DIAGNOSIS — G89.29 PAIN IN RIGHT KNEE: ICD-10-CM

## 2025-05-30 PROCEDURE — 20610 DRAIN/INJ JOINT/BURSA W/O US: CPT | Mod: 50

## 2025-05-30 RX ORDER — METHYLPREDNISOLONE 4 MG/1
4 TABLET ORAL
Qty: 1 | Refills: 0 | Status: ACTIVE | COMMUNITY
Start: 2025-05-30 | End: 1900-01-01

## 2025-06-04 ENCOUNTER — APPOINTMENT (OUTPATIENT)
Dept: OTOLARYNGOLOGY | Facility: CLINIC | Age: 75
End: 2025-06-04
Payer: MEDICARE

## 2025-06-04 PROCEDURE — V5299A: CUSTOM

## 2025-06-09 ENCOUNTER — APPOINTMENT (OUTPATIENT)
Dept: PAIN MANAGEMENT | Facility: CLINIC | Age: 75
End: 2025-06-09

## 2025-06-20 ENCOUNTER — RX RENEWAL (OUTPATIENT)
Age: 75
End: 2025-06-20

## 2025-06-27 ENCOUNTER — APPOINTMENT (OUTPATIENT)
Dept: PAIN MANAGEMENT | Facility: CLINIC | Age: 75
End: 2025-06-27

## 2025-06-27 PROCEDURE — 99213 OFFICE O/P EST LOW 20 MIN: CPT

## 2025-07-07 ENCOUNTER — RX RENEWAL (OUTPATIENT)
Age: 75
End: 2025-07-07

## 2025-07-07 RX ORDER — OXYCODONE AND ACETAMINOPHEN 7.5; 325 MG/1; MG/1
7.5-325 TABLET ORAL
Qty: 2 | Refills: 0 | Status: ACTIVE | COMMUNITY
Start: 2025-07-01 | End: 1900-01-01

## 2025-07-16 ENCOUNTER — APPOINTMENT (OUTPATIENT)
Dept: PAIN MANAGEMENT | Facility: CLINIC | Age: 75
End: 2025-07-16
Payer: MEDICARE

## 2025-07-16 PROBLEM — M17.11 PRIMARY OSTEOARTHRITIS OF RIGHT KNEE: Status: ACTIVE | Noted: 2025-07-16

## 2025-07-16 PROCEDURE — 64624 DSTRJ NULYT AGT GNCLR NRV: CPT | Mod: RT

## 2025-07-23 ENCOUNTER — APPOINTMENT (OUTPATIENT)
Dept: PAIN MANAGEMENT | Facility: CLINIC | Age: 75
End: 2025-07-23
Payer: MEDICARE

## 2025-07-23 DIAGNOSIS — M17.12 UNILATERAL PRIMARY OSTEOARTHRITIS, LEFT KNEE: ICD-10-CM

## 2025-07-23 PROCEDURE — 64624 DSTRJ NULYT AGT GNCLR NRV: CPT | Mod: LT

## 2025-08-07 ENCOUNTER — APPOINTMENT (OUTPATIENT)
Dept: PAIN MANAGEMENT | Facility: CLINIC | Age: 75
End: 2025-08-07
Payer: MEDICARE

## 2025-08-07 DIAGNOSIS — M25.561 PAIN IN RIGHT KNEE: ICD-10-CM

## 2025-08-07 DIAGNOSIS — M25.562 PAIN IN RIGHT KNEE: ICD-10-CM

## 2025-08-07 DIAGNOSIS — M17.0 BILATERAL PRIMARY OSTEOARTHRITIS OF KNEE: ICD-10-CM

## 2025-08-07 DIAGNOSIS — G89.29 PAIN IN RIGHT KNEE: ICD-10-CM

## 2025-08-07 PROCEDURE — 99213 OFFICE O/P EST LOW 20 MIN: CPT

## 2025-09-12 ENCOUNTER — APPOINTMENT (OUTPATIENT)
Dept: PAIN MANAGEMENT | Facility: CLINIC | Age: 75
End: 2025-09-12
Payer: MEDICARE

## 2025-09-12 DIAGNOSIS — M25.562 PAIN IN RIGHT KNEE: ICD-10-CM

## 2025-09-12 DIAGNOSIS — G89.29 PAIN IN RIGHT KNEE: ICD-10-CM

## 2025-09-12 DIAGNOSIS — M17.0 BILATERAL PRIMARY OSTEOARTHRITIS OF KNEE: ICD-10-CM

## 2025-09-12 DIAGNOSIS — M25.561 PAIN IN RIGHT KNEE: ICD-10-CM

## 2025-09-12 PROCEDURE — 99213 OFFICE O/P EST LOW 20 MIN: CPT | Mod: 25

## 2025-09-12 PROCEDURE — J3490M: CUSTOM

## 2025-09-12 PROCEDURE — 20610 DRAIN/INJ JOINT/BURSA W/O US: CPT | Mod: 50

## 2025-09-17 ENCOUNTER — APPOINTMENT (OUTPATIENT)
Dept: CARDIOLOGY | Facility: CLINIC | Age: 75
End: 2025-09-17
Payer: MEDICARE

## 2025-09-17 ENCOUNTER — NON-APPOINTMENT (OUTPATIENT)
Age: 75
End: 2025-09-17

## 2025-09-17 VITALS
WEIGHT: 293 LBS | HEIGHT: 65 IN | BODY MASS INDEX: 48.82 KG/M2 | SYSTOLIC BLOOD PRESSURE: 100 MMHG | DIASTOLIC BLOOD PRESSURE: 60 MMHG | HEART RATE: 66 BPM

## 2025-09-17 DIAGNOSIS — I44.7 LEFT BUNDLE-BRANCH BLOCK, UNSPECIFIED: ICD-10-CM

## 2025-09-17 DIAGNOSIS — I50.20 UNSPECIFIED SYSTOLIC (CONGESTIVE) HEART FAILURE: ICD-10-CM

## 2025-09-17 DIAGNOSIS — I34.0 NONRHEUMATIC MITRAL (VALVE) INSUFFICIENCY: ICD-10-CM

## 2025-09-17 DIAGNOSIS — I42.8 OTHER CARDIOMYOPATHIES: ICD-10-CM

## 2025-09-17 PROCEDURE — 99214 OFFICE O/P EST MOD 30 MIN: CPT

## 2025-09-17 PROCEDURE — 93000 ELECTROCARDIOGRAM COMPLETE: CPT

## 2025-09-17 PROCEDURE — G2211 COMPLEX E/M VISIT ADD ON: CPT
